# Patient Record
Sex: FEMALE | Race: WHITE | NOT HISPANIC OR LATINO | Employment: UNEMPLOYED | URBAN - METROPOLITAN AREA
[De-identification: names, ages, dates, MRNs, and addresses within clinical notes are randomized per-mention and may not be internally consistent; named-entity substitution may affect disease eponyms.]

---

## 2017-01-10 ENCOUNTER — HOSPITAL ENCOUNTER (INPATIENT)
Facility: HOSPITAL | Age: 82
LOS: 7 days | Discharge: HOME HEALTH | End: 2017-01-17
Attending: INTERNAL MEDICINE | Admitting: INTERNAL MEDICINE

## 2017-01-10 DIAGNOSIS — R26.2 DIFFICULTY WALKING: Primary | ICD-10-CM

## 2017-01-10 PROBLEM — L03.119 CELLULITIS OF LOWER EXTREMITY: Status: ACTIVE | Noted: 2017-01-10

## 2017-01-10 PROBLEM — I87.2 VENOUS STASIS DERMATITIS OF BOTH LOWER EXTREMITIES: Status: ACTIVE | Noted: 2017-01-10

## 2017-01-10 PROBLEM — Z66 DNR (DO NOT RESUSCITATE): Status: ACTIVE | Noted: 2017-01-10

## 2017-01-10 PROBLEM — S81.809A OPEN LEG WOUND: Status: ACTIVE | Noted: 2017-01-10

## 2017-01-10 PROBLEM — L03.90 CELLULITIS: Status: ACTIVE | Noted: 2017-01-10

## 2017-01-10 PROBLEM — I87.2 VENOUS INSUFFICIENCY: Status: ACTIVE | Noted: 2017-01-10

## 2017-01-10 PROBLEM — R01.1 HEART MURMUR: Status: ACTIVE | Noted: 2017-01-10

## 2017-01-10 LAB
ANION GAP SERPL CALCULATED.3IONS-SCNC: 11.7 MMOL/L
BASOPHILS # BLD AUTO: 0.03 10*3/MM3 (ref 0–0.2)
BASOPHILS NFR BLD AUTO: 0.5 % (ref 0–1.5)
BUN BLD-MCNC: 16 MG/DL (ref 8–23)
BUN/CREAT SERPL: 13.8 (ref 7–25)
CALCIUM SPEC-SCNC: 9.1 MG/DL (ref 8.2–9.6)
CHLORIDE SERPL-SCNC: 102 MMOL/L (ref 98–107)
CO2 SERPL-SCNC: 26.3 MMOL/L (ref 22–29)
CREAT BLD-MCNC: 1.16 MG/DL (ref 0.57–1)
DEPRECATED RDW RBC AUTO: 51.2 FL (ref 37–54)
EOSINOPHIL # BLD AUTO: 0.23 10*3/MM3 (ref 0–0.7)
EOSINOPHIL NFR BLD AUTO: 3.6 % (ref 0.3–6.2)
ERYTHROCYTE [DISTWIDTH] IN BLOOD BY AUTOMATED COUNT: 14.4 % (ref 11.7–13)
GFR SERPL CREATININE-BSD FRML MDRD: 43 ML/MIN/1.73
GLUCOSE BLD-MCNC: 95 MG/DL (ref 65–99)
HCT VFR BLD AUTO: 33.7 % (ref 35.6–45.5)
HGB BLD-MCNC: 10.4 G/DL (ref 11.9–15.5)
IMM GRANULOCYTES # BLD: 0 10*3/MM3 (ref 0–0.03)
IMM GRANULOCYTES NFR BLD: 0 % (ref 0–0.5)
LYMPHOCYTES # BLD AUTO: 1.2 10*3/MM3 (ref 0.9–4.8)
LYMPHOCYTES NFR BLD AUTO: 18.9 % (ref 19.6–45.3)
MAGNESIUM SERPL-MCNC: 2.3 MG/DL (ref 1.7–2.3)
MCH RBC QN AUTO: 30.1 PG (ref 26.9–32)
MCHC RBC AUTO-ENTMCNC: 30.9 G/DL (ref 32.4–36.3)
MCV RBC AUTO: 97.7 FL (ref 80.5–98.2)
MONOCYTES # BLD AUTO: 0.88 10*3/MM3 (ref 0.2–1.2)
MONOCYTES NFR BLD AUTO: 13.9 % (ref 5–12)
NEUTROPHILS # BLD AUTO: 4 10*3/MM3 (ref 1.9–8.1)
NEUTROPHILS NFR BLD AUTO: 63.1 % (ref 42.7–76)
PHOSPHATE SERPL-MCNC: 3.6 MG/DL (ref 2.5–4.5)
PLATELET # BLD AUTO: 318 10*3/MM3 (ref 140–500)
PMV BLD AUTO: 8.7 FL (ref 6–12)
POTASSIUM BLD-SCNC: 4.5 MMOL/L (ref 3.5–5.2)
RBC # BLD AUTO: 3.45 10*6/MM3 (ref 3.9–5.2)
SODIUM BLD-SCNC: 140 MMOL/L (ref 136–145)
WBC NRBC COR # BLD: 6.34 10*3/MM3 (ref 4.5–10.7)

## 2017-01-10 PROCEDURE — 25010000003 CEFAZOLIN PER 500 MG: Performed by: INTERNAL MEDICINE

## 2017-01-10 PROCEDURE — 80048 BASIC METABOLIC PNL TOTAL CA: CPT | Performed by: INTERNAL MEDICINE

## 2017-01-10 PROCEDURE — 83735 ASSAY OF MAGNESIUM: CPT | Performed by: INTERNAL MEDICINE

## 2017-01-10 PROCEDURE — 85025 COMPLETE CBC W/AUTO DIFF WBC: CPT | Performed by: INTERNAL MEDICINE

## 2017-01-10 PROCEDURE — 84100 ASSAY OF PHOSPHORUS: CPT | Performed by: INTERNAL MEDICINE

## 2017-01-10 RX ORDER — RANITIDINE 150 MG/1
150 TABLET ORAL DAILY
COMMUNITY

## 2017-01-10 RX ORDER — CYCLOSPORINE 0.5 MG/ML
1 EMULSION OPHTHALMIC EVERY 12 HOURS
COMMUNITY

## 2017-01-10 RX ORDER — PRAMIPEXOLE DIHYDROCHLORIDE 0.25 MG/1
0.25 TABLET ORAL NIGHTLY
Status: DISCONTINUED | OUTPATIENT
Start: 2017-01-10 | End: 2017-01-17 | Stop reason: HOSPADM

## 2017-01-10 RX ORDER — LOSARTAN POTASSIUM 50 MG/1
100 TABLET ORAL DAILY
COMMUNITY

## 2017-01-10 RX ORDER — LOSARTAN POTASSIUM 50 MG/1
50 TABLET ORAL DAILY
Status: DISCONTINUED | OUTPATIENT
Start: 2017-01-10 | End: 2017-01-17 | Stop reason: HOSPADM

## 2017-01-10 RX ORDER — HYDROCODONE BITARTRATE AND ACETAMINOPHEN 5; 325 MG/1; MG/1
0.5 TABLET ORAL EVERY 6 HOURS
Status: ON HOLD | COMMUNITY
End: 2017-01-17

## 2017-01-10 RX ORDER — BACLOFEN 10 MG/1
5 TABLET ORAL NIGHTLY
COMMUNITY

## 2017-01-10 RX ORDER — ACETAMINOPHEN 500 MG
500 TABLET ORAL EVERY 4 HOURS PRN
COMMUNITY

## 2017-01-10 RX ORDER — HYDROCODONE BITARTRATE AND ACETAMINOPHEN 5; 325 MG/1; MG/1
0.5 TABLET ORAL EVERY 6 HOURS
Status: DISCONTINUED | OUTPATIENT
Start: 2017-01-10 | End: 2017-01-17 | Stop reason: HOSPADM

## 2017-01-10 RX ORDER — FAMOTIDINE 20 MG/1
20 TABLET, FILM COATED ORAL DAILY
Status: DISCONTINUED | OUTPATIENT
Start: 2017-01-10 | End: 2017-01-17 | Stop reason: HOSPADM

## 2017-01-10 RX ORDER — SENNOSIDES 8.6 MG
2 TABLET ORAL NIGHTLY
Status: DISCONTINUED | OUTPATIENT
Start: 2017-01-10 | End: 2017-01-17 | Stop reason: HOSPADM

## 2017-01-10 RX ORDER — MAGNESIUM CARB/ALUMINUM HYDROX 105-160MG
150 TABLET,CHEWABLE ORAL AS NEEDED
COMMUNITY

## 2017-01-10 RX ORDER — ACETAMINOPHEN 325 MG/1
650 TABLET ORAL EVERY 4 HOURS PRN
Status: DISCONTINUED | OUTPATIENT
Start: 2017-01-10 | End: 2017-01-17 | Stop reason: HOSPADM

## 2017-01-10 RX ORDER — PROMETHAZINE HYDROCHLORIDE 25 MG/1
25 TABLET ORAL EVERY 6 HOURS PRN
Status: DISCONTINUED | OUTPATIENT
Start: 2017-01-10 | End: 2017-01-17 | Stop reason: HOSPADM

## 2017-01-10 RX ORDER — TRAMADOL HYDROCHLORIDE 50 MG/1
50 TABLET ORAL NIGHTLY
Status: ON HOLD | COMMUNITY
End: 2017-01-17

## 2017-01-10 RX ORDER — BACLOFEN 10 MG/1
5 TABLET ORAL NIGHTLY
Status: DISCONTINUED | OUTPATIENT
Start: 2017-01-10 | End: 2017-01-17 | Stop reason: HOSPADM

## 2017-01-10 RX ORDER — LATANOPROST 50 UG/ML
1 SOLUTION/ DROPS OPHTHALMIC NIGHTLY
Status: DISCONTINUED | OUTPATIENT
Start: 2017-01-10 | End: 2017-01-17 | Stop reason: HOSPADM

## 2017-01-10 RX ORDER — TRAMADOL HYDROCHLORIDE 50 MG/1
50 TABLET ORAL NIGHTLY
Status: DISCONTINUED | OUTPATIENT
Start: 2017-01-10 | End: 2017-01-17 | Stop reason: HOSPADM

## 2017-01-10 RX ORDER — SENNOSIDES 8.6 MG
2 TABLET ORAL NIGHTLY
COMMUNITY

## 2017-01-10 RX ORDER — ASPIRIN 81 MG/1
81 TABLET, CHEWABLE ORAL DAILY
COMMUNITY

## 2017-01-10 RX ORDER — HYDROCODONE BITARTRATE AND ACETAMINOPHEN 5; 325 MG/1; MG/1
0.5 TABLET ORAL EVERY 4 HOURS PRN
Status: DISCONTINUED | OUTPATIENT
Start: 2017-01-10 | End: 2017-01-17 | Stop reason: HOSPADM

## 2017-01-10 RX ORDER — CILOSTAZOL 100 MG/1
100 TABLET ORAL 2 TIMES DAILY
Status: DISCONTINUED | OUTPATIENT
Start: 2017-01-10 | End: 2017-01-17 | Stop reason: HOSPADM

## 2017-01-10 RX ORDER — ONDANSETRON 4 MG/1
4 TABLET, FILM COATED ORAL EVERY 6 HOURS PRN
Status: DISCONTINUED | OUTPATIENT
Start: 2017-01-10 | End: 2017-01-17 | Stop reason: HOSPADM

## 2017-01-10 RX ORDER — SODIUM CHLORIDE 0.9 % (FLUSH) 0.9 %
1-10 SYRINGE (ML) INJECTION AS NEEDED
Status: DISCONTINUED | OUTPATIENT
Start: 2017-01-10 | End: 2017-01-17 | Stop reason: HOSPADM

## 2017-01-10 RX ORDER — DOCUSATE SODIUM 250 MG
250 CAPSULE ORAL NIGHTLY
COMMUNITY

## 2017-01-10 RX ORDER — ONDANSETRON 2 MG/ML
4 INJECTION INTRAMUSCULAR; INTRAVENOUS EVERY 6 HOURS PRN
Status: DISCONTINUED | OUTPATIENT
Start: 2017-01-10 | End: 2017-01-17 | Stop reason: HOSPADM

## 2017-01-10 RX ORDER — POLYETHYLENE GLYCOL 3350 17 G/17G
17 POWDER, FOR SOLUTION ORAL 2 TIMES DAILY
Status: DISCONTINUED | OUTPATIENT
Start: 2017-01-10 | End: 2017-01-17 | Stop reason: HOSPADM

## 2017-01-10 RX ORDER — POLYETHYLENE GLYCOL 3350 17 G/17G
17 POWDER, FOR SOLUTION ORAL 2 TIMES DAILY
COMMUNITY

## 2017-01-10 RX ORDER — CIPROFLOXACIN 250 MG/1
250 TABLET, FILM COATED ORAL 2 TIMES DAILY
COMMUNITY
End: 2017-01-17 | Stop reason: HOSPADM

## 2017-01-10 RX ORDER — GABAPENTIN 300 MG/1
300 CAPSULE ORAL 2 TIMES DAILY
Status: DISCONTINUED | OUTPATIENT
Start: 2017-01-10 | End: 2017-01-17 | Stop reason: HOSPADM

## 2017-01-10 RX ORDER — HYDROCODONE BITARTRATE AND ACETAMINOPHEN 5; 325 MG/1; MG/1
0.5 TABLET ORAL EVERY 4 HOURS PRN
COMMUNITY
End: 2017-01-17 | Stop reason: HOSPADM

## 2017-01-10 RX ORDER — CHOLECALCIFEROL (VITAMIN D3) 10 MCG
1 TABLET ORAL DAILY
COMMUNITY

## 2017-01-10 RX ORDER — LATANOPROST 50 UG/ML
1 SOLUTION/ DROPS OPHTHALMIC NIGHTLY
COMMUNITY

## 2017-01-10 RX ORDER — LACTULOSE 10 G/15ML
30 SOLUTION ORAL 2 TIMES DAILY PRN
COMMUNITY

## 2017-01-10 RX ORDER — LACTULOSE 10 G/15ML
30 SOLUTION ORAL 2 TIMES DAILY PRN
Status: DISCONTINUED | OUTPATIENT
Start: 2017-01-10 | End: 2017-01-17 | Stop reason: HOSPADM

## 2017-01-10 RX ORDER — AMOXICILLIN 500 MG/1
500 CAPSULE ORAL AS NEEDED
COMMUNITY
End: 2017-01-17 | Stop reason: HOSPADM

## 2017-01-10 RX ORDER — PRAMIPEXOLE DIHYDROCHLORIDE 0.25 MG/1
0.25 TABLET ORAL NIGHTLY
COMMUNITY

## 2017-01-10 RX ORDER — ACETAMINOPHEN 500 MG
500 TABLET ORAL EVERY 4 HOURS PRN
Status: DISCONTINUED | OUTPATIENT
Start: 2017-01-10 | End: 2017-01-17 | Stop reason: HOSPADM

## 2017-01-10 RX ORDER — ONDANSETRON 4 MG/1
4 TABLET, ORALLY DISINTEGRATING ORAL EVERY 6 HOURS PRN
Status: DISCONTINUED | OUTPATIENT
Start: 2017-01-10 | End: 2017-01-17 | Stop reason: HOSPADM

## 2017-01-10 RX ORDER — ASPIRIN 81 MG/1
81 TABLET, CHEWABLE ORAL DAILY
Status: DISCONTINUED | OUTPATIENT
Start: 2017-01-10 | End: 2017-01-17 | Stop reason: HOSPADM

## 2017-01-10 RX ORDER — CILOSTAZOL 100 MG/1
100 TABLET ORAL 2 TIMES DAILY
COMMUNITY

## 2017-01-10 RX ORDER — PROMETHAZINE HYDROCHLORIDE 25 MG/1
25 TABLET ORAL EVERY 6 HOURS PRN
COMMUNITY

## 2017-01-10 RX ORDER — GABAPENTIN 300 MG/1
300 CAPSULE ORAL 2 TIMES DAILY
COMMUNITY

## 2017-01-10 RX ORDER — CYCLOSPORINE 0.5 MG/ML
1 EMULSION OPHTHALMIC EVERY 12 HOURS
Status: DISCONTINUED | OUTPATIENT
Start: 2017-01-10 | End: 2017-01-17 | Stop reason: HOSPADM

## 2017-01-10 RX ORDER — DOCUSATE SODIUM 250 MG
250 CAPSULE ORAL NIGHTLY
Status: DISCONTINUED | OUTPATIENT
Start: 2017-01-10 | End: 2017-01-17 | Stop reason: HOSPADM

## 2017-01-10 RX ADMIN — ASPIRIN 81 MG: 81 TABLET, CHEWABLE ORAL at 16:02

## 2017-01-10 RX ADMIN — GABAPENTIN 300 MG: 300 CAPSULE ORAL at 16:02

## 2017-01-10 RX ADMIN — LOSARTAN POTASSIUM 50 MG: 50 TABLET, FILM COATED ORAL at 16:02

## 2017-01-10 RX ADMIN — CEFAZOLIN SODIUM 1 G: 1 INJECTION, SOLUTION INTRAVENOUS at 17:53

## 2017-01-10 RX ADMIN — HYDROCODONE BITARTRATE AND ACETAMINOPHEN 0.5 TABLET: 5; 325 TABLET ORAL at 21:17

## 2017-01-10 RX ADMIN — FAMOTIDINE 20 MG: 20 TABLET, FILM COATED ORAL at 16:02

## 2017-01-10 RX ADMIN — BACLOFEN 5 MG: 10 TABLET ORAL at 21:18

## 2017-01-10 RX ADMIN — LATANOPROST 1 DROP: 50 SOLUTION OPHTHALMIC at 21:19

## 2017-01-10 RX ADMIN — SENNOSIDES 17.2 MG: 8.6 TABLET, FILM COATED ORAL at 21:17

## 2017-01-10 RX ADMIN — PRAMIPEXOLE DIHYDROCHLORIDE 0.25 MG: 0.25 TABLET ORAL at 21:18

## 2017-01-10 RX ADMIN — TRAMADOL HYDROCHLORIDE 50 MG: 50 TABLET, COATED ORAL at 21:17

## 2017-01-10 RX ADMIN — DOCUSATE SODIUM 250 MG: 250 CAPSULE, LIQUID FILLED ORAL at 21:18

## 2017-01-10 RX ADMIN — POLYETHYLENE GLYCOL 3350 17 G: 17 POWDER, FOR SOLUTION ORAL at 16:03

## 2017-01-10 RX ADMIN — HYDROCODONE BITARTRATE AND ACETAMINOPHEN 0.5 TABLET: 5; 325 TABLET ORAL at 16:02

## 2017-01-10 RX ADMIN — CILOSTAZOL 100 MG: 100 TABLET ORAL at 16:03

## 2017-01-10 RX ADMIN — CYCLOSPORINE 1 DROP: 0.5 EMULSION OPHTHALMIC at 16:03

## 2017-01-10 NOTE — PLAN OF CARE
Problem: Patient Care Overview (Adult)  Goal: Plan of Care Review  Outcome: Ongoing (interventions implemented as appropriate)    01/10/17 1840   Coping/Psychosocial Response Interventions   Plan Of Care Reviewed With patient   Patient Care Overview   Progress no change   Outcome Evaluation   Outcome Summary/Follow up Plan pt is a direct admit for cellulitis of both legs. pictures taken on admission. pt is complaining of pain in legs rating at a 5. scheduled pain medication given. pt had legs elevated and wrapped with ace wrap and bandages. pt is currently resting in bed.        Goal: Adult Individualization and Mutuality  Outcome: Ongoing (interventions implemented as appropriate)  Goal: Discharge Needs Assessment  Outcome: Ongoing (interventions implemented as appropriate)    Problem: Fall Risk (Adult)  Goal: Identify Related Risk Factors and Signs and Symptoms  Outcome: Outcome(s) achieved Date Met:  01/10/17  Goal: Absence of Falls  Outcome: Ongoing (interventions implemented as appropriate)    Problem: Skin Integrity Impairment, Risk/Actual (Adult)  Goal: Identify Related Risk Factors and Signs and Symptoms  Outcome: Outcome(s) achieved Date Met:  01/10/17  Goal: Skin Integrity/Wound Healing  Outcome: Ongoing (interventions implemented as appropriate)    Problem: Pain, Acute (Adult)  Goal: Identify Related Risk Factors and Signs and Symptoms  Outcome: Outcome(s) achieved Date Met:  01/10/17  Goal: Acceptable Pain Control/Comfort Level  Outcome: Ongoing (interventions implemented as appropriate)

## 2017-01-10 NOTE — CONSULTS
Patient Name: Juliana Gardner Account #: [id number]    MRN: [id number] Admission Date: 1/10/2017      Consulting Service: Vascular Surgery Date of Evaluation: January 10, 2017    Requesting Provider: Terence    CHIEF COMPLAINT: Lower extremity edema with stasis ulcerations and cellulitis   HPI: Juliana Gardner is a 89 y.o. female is being seen for a consultation and evaluation/management of bilateral lower extremity edema with stasis ulcerations and cellulitis.  Patient is well known to me and was in the office yesterday for the same complaint.  She did inform me that she does not is getting better but actually after talking with her primary care doctor Dr. Leah Noel piercings and actually worsened significantly over the last several weeks and she is now being admitted for elevation control the edema with wraps and wound care of the ulcers with appropriate antibiotic therapy.  Complicating her issues is that she is not been able to tolerate includes.  I appreciate Dr. 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       PAST MEDICAL HISTORY:   Past Medical History   Diagnosis Date   • Cellulitis of lower extremity 1/10/2017   • Gastritis    • GERD (gastroesophageal reflux disease)    • Hearing loss    • Heart murmur    • Hypertension    • Macular degeneration    • OA (osteoarthritis)    • Venous stasis dermatitis of both lower extremities 1/10/2017   • Vitamin D deficiency       PAST SURGICAL HISTORY:   Past Surgical History   Procedure Laterality Date   • Replacement total knee        FAMILY HISTORY:   Family History   Problem Relation Age of Onset   • Lung cancer Other       SOCIAL HISTORY:   Social History   Substance Use Topics   • Smoking status: Never Smoker   • Smokeless tobacco: Not on file   • Alcohol use No      MEDICATIONS:   No current facility-administered medications on file prior to encounter.      No current outpatient prescriptions on file prior to encounter.             ALLERGIES:  Adhesive tape; Clindamycin/lincomycin; Rifampin; and Sulfa antibiotics   COMPLETE REVIEW OF SYSTEMS:     ENT ROS: negative  Cardiovascular ROS: no chest pain or dyspnea on exertion  Respiratory ROS: no cough, shortness of breath, or wheezing  Gastrointestinal ROS: no abdominal pain, change in bowel habits, or black or bloody stools  Neurological ROS: no TIA or stroke symptoms  Genito-Urinary ROS: no dysuria, trouble voiding, or hematuria  Musculoskeletal ROS:swelling and ulcers  Dermatological ROS: negative  Psychological ROS: negative        PHYSICAL EXAM:   Patient Vitals for the past 24 hrs:   BP Temp Temp src Pulse Resp SpO2 Height Weight   01/10/17 1220 137/61 97.5 °F (36.4 °C) Oral 76 14 95 % - 129 lb 14.4 oz (58.9 kg)        General appearance: alert, well appearing, and in no distress.  Neurological exam reveals alert, oriented, normal speech, no focal findings or movement disorder noted.  ENT exam reveals - ENT exam normal, no neck nodes or sinus tenderness.  CVS exam: normal rate, regular rhythm, normal S1, S2, no murmurs, rubs, clicks or gallops.  Chest: clear to auscultation, no wheezes, rales or rhonchi, symmetric air entry.  Abdominal exam: soft, nontender, nondistended, no masses or organomegaly.  Examination of the feet reveals warm, good capillary refill and severe edema with bilateral lower extremities.  Left is worse than right with large stasis ulcerations with  superimposed infection on the ulcers and within the skin surrounding it..  Pulse exam:     Right    Left  Carotid   2 2  Brachial  2 2   Radial   2 2  Femoral  2 2  Aorta    1  Popliteal  1 1  Dorsalis Pedis  1 1  Posterior Tibial   1 1          LABS:      Recent Results (from the past 24 hour(s))   Basic Metabolic Panel    Collection Time: 01/10/17 12:32 PM   Result Value Ref Range    Glucose 95 65 - 99 mg/dL    BUN 16 8 - 23 mg/dL    Creatinine 1.16 (H) 0.57 - 1.00 mg/dL    Sodium 140 136 - 145 mmol/L    Potassium 4.5 3.5 - 5.2 mmol/L     Chloride 102 98 - 107 mmol/L    CO2 26.3 22.0 - 29.0 mmol/L    Calcium 9.1 8.2 - 9.6 mg/dL    eGFR Non African Amer 43 (L) >60 mL/min/1.73    BUN/Creatinine Ratio 13.8 7.0 - 25.0    Anion Gap 11.7 mmol/L   Magnesium    Collection Time: 01/10/17 12:32 PM   Result Value Ref Range    Magnesium 2.3 1.7 - 2.3 mg/dL   Phosphorus    Collection Time: 01/10/17 12:32 PM   Result Value Ref Range    Phosphorus 3.6 2.5 - 4.5 mg/dL   CBC Auto Differential    Collection Time: 01/10/17 12:32 PM   Result Value Ref Range    WBC 6.34 4.50 - 10.70 10*3/mm3    RBC 3.45 (L) 3.90 - 5.20 10*6/mm3    Hemoglobin 10.4 (L) 11.9 - 15.5 g/dL    Hematocrit 33.7 (L) 35.6 - 45.5 %    MCV 97.7 80.5 - 98.2 fL    MCH 30.1 26.9 - 32.0 pg    MCHC 30.9 (L) 32.4 - 36.3 g/dL    RDW 14.4 (H) 11.7 - 13.0 %    RDW-SD 51.2 37.0 - 54.0 fl    MPV 8.7 6.0 - 12.0 fL    Platelets 318 140 - 500 10*3/mm3    Neutrophil % 63.1 42.7 - 76.0 %    Lymphocyte % 18.9 (L) 19.6 - 45.3 %    Monocyte % 13.9 (H) 5.0 - 12.0 %    Eosinophil % 3.6 0.3 - 6.2 %    Basophil % 0.5 0.0 - 1.5 %    Immature Grans % 0.0 0.0 - 0.5 %    Neutrophils, Absolute 4.00 1.90 - 8.10 10*3/mm3    Lymphocytes, Absolute 1.20 0.90 - 4.80 10*3/mm3    Monocytes, Absolute 0.88 0.20 - 1.20 10*3/mm3    Eosinophils, Absolute 0.23 0.00 - 0.70 10*3/mm3    Basophils, Absolute 0.03 0.00 - 0.20 10*3/mm3    Immature Grans, Absolute 0.00 0.00 - 0.03 10*3/mm3   ]    The following radiologic or non-invasive studies have been reviewed by me:        ASSESSMENT/PLAN: 89 y.o. female with stasis ulcerations and chronic edema resistant to outpatient care.  She is in need of antibiotic therapy elevation compression and wound care planning.  We will follow along with you and try to write orders for the wound care.  We'll asked wound care nursing to evaluate as well.  We'll elevate and use wraps while she is in.      I discussed the plan with the patient and she is agreeable to the plan of care at this point. Thank you for  this consult.     Bo Albrecht MD   01/10/17

## 2017-01-10 NOTE — NURSING NOTE
01/10/17 1518   Wound 01/10/17 Left lower     Date first assessed: 01/10/17   Side: Left  Orientation: lower  Location: (c)   Type: (c)    Wound WDL ex   Dressing Appearance moist drainage   Base reddened;yellow;not granulating   Periwound Area redness;swelling;warm   Edges irregular   Length (cm) 5   Width (cm) 2   Depth (cm) 0.1   Drainage Characteristics/Odor yellow   Drainage Amount small   Wound Cleaning cleansed with;sterile normal saline   Dressing Dressing removed;Dressing applied;Dressing changed;silver impregnated dressing;non-adherent   Wound 01/10/17 Left lower     Date first assessed: 01/10/17   Side: Left  Orientation: lower  Location: (c)   Type: (c)    Wound WDL ex   Dressing Appearance moist drainage   Base reddened;slough;yellow;not granulating;moist   Periwound Area redness;swelling   Edges irregular   Length (cm) 3   Width (cm) 5   Depth (cm) 0.1   Drainage Characteristics/Odor yellow   Drainage Amount small   Wound Cleaning cleansed with;sterile normal saline   Dressing Dressing removed;Dressing applied;Dressing changed;silver impregnated dressing;non-adherent   Wound 01/10/17 Right lower     Date first assessed: 01/10/17   Side: Right  Orientation: lower  Location: (c)   Type: (c)    Wound WDL ex   Dressing Appearance dried drainage   Base reddened;yellow   Edges irregular   Length (cm) 3   Width (cm) 1   Drainage Characteristics/Odor yellow   Drainage Amount small   Hydrofiber with silver dressing applied to leg ulcers, covered with silicone border dressing.  Kerlix roll ace wraps bilateral. 3-4 plus edema. Pulse palpable. Legs elevated on pillows

## 2017-01-10 NOTE — H&P
Patient Care Team:  Wanda Noel MD as PCP - General (Internal Medicine)    Chief complaint : leg wounds and redness    Subjective     History of Present Illness    Sister Juliana Gardner is a very pleasant 89 year old who has had issues with recurrent wound and infection of her legs. She states issue has been waxing and waning for the past 6 months or so. Has been on a few different wound care regimen as well as different PO abx- most recently a course of CIPRO which finished 1/1/17. Still not improved. No fevers/chills. Seen by Dr. Albrecht recently who felt as she is having persistent infection as outpatient that would need to be admitted for IV abx and continued wound care.     Review of Systems   Constitutional: Negative.    HENT: Negative.    Eyes: Negative.    Respiratory: Negative.    Cardiovascular: Positive for leg swelling. Negative for chest pain and palpitations.   Gastrointestinal: Negative.    Endocrine: Negative.    Genitourinary: Negative.    Musculoskeletal: Negative.    Skin: Positive for color change, rash and wound.   Allergic/Immunologic: Negative.    Neurological: Negative.    Hematological: Negative.    Psychiatric/Behavioral: Negative.     Ext- chronic leg welling, no history of blood clots  Ear- chronic hard of hearing    Past Medical History   Diagnosis Date   • Cellulitis of lower extremity 1/10/2017   • Gastritis    • GERD (gastroesophageal reflux disease)    • Hearing loss    • Heart murmur    • Hypertension    • Macular degeneration    • OA (osteoarthritis)    • Venous stasis dermatitis of both lower extremities 1/10/2017   • Vitamin D deficiency      Past Surgical History   Procedure Laterality Date   • Replacement total knee       Family History   Problem Relation Age of Onset   • Lung cancer Other      Social History   Substance Use Topics   • Smoking status: Never Smoker   • Smokeless tobacco: Not on file   • Alcohol use No     Prescriptions Prior to Admission   Medication Sig  Dispense Refill Last Dose   • acetaminophen (TYLENOL) 500 MG tablet Take 500 mg by mouth Every 4 (Four) Hours As Needed for mild pain (1-3) or fever.      • amoxicillin (AMOXIL) 500 MG capsule Take 500 mg by mouth As Needed. Give 4 capsules as needed one hour before dental appointments      • aspirin 81 MG chewable tablet Chew 81 mg Daily.      • b complex-C-folic acid 1 MG capsule Take 1 capsule by mouth Daily.      • baclofen (LIORESAL) 10 MG tablet Take 5 mg by mouth Every Night.      • cilostazol (PLETAL) 100 MG tablet Take 100 mg by mouth 2 (Two) Times a Day.      • ciprofloxacin (CIPRO) 250 MG tablet Take 250 mg by mouth 2 (Two) Times a Day.      • cycloSPORINE (RESTASIS) 0.05 % ophthalmic emulsion Administer 1 drop to both eyes Every 12 (Twelve) Hours.      • docusate sodium (COLACE) 250 MG capsule Take 250 mg by mouth Every Night.      • gabapentin (NEURONTIN) 300 MG capsule Take 300 mg by mouth 2 (Two) Times a Day.      • HYDROcodone-acetaminophen (NORCO) 5-325 MG per tablet Take 0.5 tablets by mouth Every 6 (Six) Hours.      • HYDROcodone-acetaminophen (NORCO) 5-325 MG per tablet Take 0.5 tablets by mouth Every 4 (Four) Hours As Needed for mild pain (1-3). May be given in between scheduled dose      • lactulose (CHRONULAC) 10 GM/15ML solution Take 30 mL by mouth 2 (Two) Times a Day As Needed (bowel management).      • latanoprost (XALATAN) 0.005 % ophthalmic solution Administer 1 drop to both eyes Every Night.      • losartan (COZAAR) 50 MG tablet Take 100 mg by mouth Daily. Hold for systolic blood pressure <110      • magnesium citrate 1.745 GM/30ML solution solution Take 150 mL by mouth As Needed (bowel management).      • mineral oil 55 % oil oral liquid Take 15 mL by mouth Daily.      • polyethylene glycol (MIRALAX) packet Take 17 g by mouth 2 (Two) Times a Day.      • pramipexole (MIRAPEX) 0.25 MG tablet Take 0.25 mg by mouth Every Night.      • promethazine (PHENERGAN) 25 MG tablet Take 25 mg by  mouth Every 6 (Six) Hours As Needed for nausea or vomiting.      • raNITIdine (ZANTAC) 150 MG tablet Take 150 mg by mouth Daily.      • senna (SENOKOT) 8.6 MG tablet tablet Take 2 tablets by mouth Every Night.      • traMADol (ULTRAM) 50 MG tablet Take 50 mg by mouth Every Night.        Allergies:  Adhesive tape; Clindamycin/lincomycin; Rifampin; and Sulfa antibiotics    Objective      Vital Signs       Physical Exam   Constitutional: She is oriented to person, place, and time. No distress.   Elderly but NAD   HENT:   Head: Normocephalic and atraumatic.   Mouth/Throat: Oropharynx is clear and moist.   Eyes: EOM are normal. Pupils are equal, round, and reactive to light. No scleral icterus.   Neck: Normal range of motion. Neck supple.   Cardiovascular: Normal rate and regular rhythm.    Murmur heard.  Pulmonary/Chest: Effort normal and breath sounds normal.   Abdominal: Soft. Bowel sounds are normal. She exhibits no distension. There is no tenderness.   Genitourinary:   Genitourinary Comments: Deferred    Musculoskeletal: She exhibits edema.   Neurological: She is alert and oriented to person, place, and time. No cranial nerve deficit.   Hard of hearing   Skin: Skin is warm. Rash (to lower extremities bilaterally. Erythema c/w cellulitis and venous stasis as well as ulcerations) noted.   Psychiatric: She has a normal mood and affect. Her behavior is normal. Thought content normal.   Nursing note and vitals reviewed.      Results Review:   I reviewed the patient's new clinical results.      CBC Auto Differential [[id number]]        Lab Status: No result Specimen: Blood        Basic Metabolic Panel [[id number]]        Lab Status: No result Specimen: Blood        Magnesium [[id number]]        Lab Status: No result Specimen: Blood        Phosphorus [[id number]]        Lab Status: No result Specimen: Blood       Reviewed records from her SN[facility]      Assessment/Plan     Active Problems:    Cellulitis of lower extremity    Venous  stasis dermatitis of both lower extremities    Open leg wound    DNR (do not resuscitate)    Heart murmur    Cellulitis    Venous insufficiency of both lower extremities    -IV abx with Cefazolin for likely strep. Decreased risk of adverse reaction including renal failure compared to vanc but will see how she responds.  -Check labs  -DNR/DNI- confirmed with pt  -Dr Albrecht consult for wound care. If some topical steroids could be involved could improve the element of stasis dermaitits, though likely would not want to apply that in area of the ulcerations/wound.   -PRN agents for pain, nausea  -Will not workup heart murmur here. Listed as having prn abx before dental procedure so im sure this is known/old.     Assessment & Plan    I discussed the patients findings and my recommendations with patient, family and consulting provider- Dr. Ambrocio Pratt MD  01/10/17  12:18 PM

## 2017-01-10 NOTE — IP AVS SNAPSHOT
AFTER VISIT SUMMARY             Juliana Jj           About your hospitalization     You were admitted on:  January 10, 2017 You last received care in the:  [address]       Procedures & Surgeries         Medications    If you or your caregiver advised us that you are currently taking a medication and that medication is marked below as “Resume”, this simply indicates that we have reviewed those medications to make sure our new therapy recommendations do not interfere.  If you have concerns about medications other than those new ones which we are prescribing today, please consult the physician who prescribed them (or your primary physician).  Our review of your home medications is not meant to indicate that we are directing their use.             Your Medications      START taking these medications     cephalexin 500 MG capsule   Take 1 capsule by mouth 2 (Two) Times a Day.   Commonly known as:  KEAGAN             CHANGE how you take these medications     HYDROcodone-acetaminophen 5-325 MG per tablet   Take 0.5 tablets by mouth Every 4 (Four) Hours As Needed for moderate pain (4-6).   Last time this was given:  1/17/2017 12:49 PM   Commonly known as:  JACQUE   What changed:    - when to take this  - reasons to take this  - Another medication with the same name was removed. Continue taking this medication, and follow the directions you see here.             CONTINUE taking these medications     acetaminophen 500 MG tablet   Take 500 mg by mouth Every 4 (Four) Hours As Needed for mild pain (1-3) or fever.   Commonly known as:  TYLENOL           aspirin 81 MG chewable tablet   Chew 81 mg Daily.   Last time this was given:  1/17/2017  9:31 AM           b complex-C-folic acid 1 MG capsule   Take 1 capsule by mouth Daily.           baclofen 10 MG tablet   Take 5 mg by mouth Every Night.   Last time this was given:  1/16/2017  8:36 PM   Commonly known as:  LIORESAL           cilostazol 100 MG tablet    Take 100 mg by mouth 2 (Two) Times a Day.   Last time this was given:  1/17/2017  9:31 AM   Commonly known as:  PLETAL           cycloSPORINE 0.05 % ophthalmic emulsion   Administer 1 drop to both eyes Every 12 (Twelve) Hours.   Last time this was given:  1/17/2017  9:34 AM   Commonly known as:  RESTASIS           docusate sodium 250 MG capsule   Take 250 mg by mouth Every Night.   Last time this was given:  1/16/2017  8:36 PM   Commonly known as:  COLACE           gabapentin 300 MG capsule   Take 300 mg by mouth 2 (Two) Times a Day.   Last time this was given:  1/17/2017  9:31 AM   Commonly known as:  NEURONTIN           lactulose 10 GM/15ML solution   Take 30 mL by mouth 2 (Two) Times a Day As Needed (bowel management).   Commonly known as:  CHRONULAC           latanoprost 0.005 % ophthalmic solution   Administer 1 drop to both eyes Every Night.   Last time this was given:  1/16/2017  8:36 PM   Commonly known as:  XALATAN           losartan 50 MG tablet   Take 100 mg by mouth Daily. Hold for systolic blood pressure <110   Last time this was given:  1/17/2017  9:31 AM   Commonly known as:  COZAAR           magnesium citrate 1.745 GM/30ML solution solution   Take 150 mL by mouth As Needed (bowel management).           mineral oil 55 % oil oral liquid   Take 15 mL by mouth Daily.           polyethylene glycol packet   Take 17 g by mouth 2 (Two) Times a Day.   Last time this was given:  1/17/2017  9:30 AM   Commonly known as:  MIRALAX           pramipexole 0.25 MG tablet   Take 0.25 mg by mouth Every Night.   Last time this was given:  1/16/2017  8:36 PM   Commonly known as:  MIRAPEX           promethazine 25 MG tablet   Take 25 mg by mouth Every 6 (Six) Hours As Needed for nausea or vomiting.   Commonly known as:  PHENERGAN           raNITIdine 150 MG tablet   Take 150 mg by mouth Daily.   Commonly known as:  ZANTAC           senna 8.6 MG tablet tablet   Take 2 tablets by mouth Every Night.   Last time this was  given:  1/16/2017  8:36 PM   Commonly known as:  SENOKOT           traMADol 50 MG tablet   Take 1 tablet by mouth Every Night.   Last time this was given:  1/16/2017  8:36 PM   Commonly known as:  ULTRAM             STOP taking these medications     amoxicillin 500 MG capsule   Commonly known as:  AMOXIL           ciprofloxacin 250 MG tablet   Commonly known as:  CIPRO                Where to Get Your Medications      These medications were sent to [facility] [address] [facility] - [phone] [facility] [phone]   [address] [facility], [place] [id number]     Phone:  [phone]     cephalexin 500 MG capsule         You can get these medications from any pharmacy     Bring a paper prescription for each of these medications     HYDROcodone-acetaminophen 5-325 MG per tablet    traMADol 50 MG tablet                  Your Medications      Your Medication List           Morning Noon Evening Bedtime As Needed    acetaminophen 500 MG tablet   Take 500 mg by mouth Every 4 (Four) Hours As Needed for mild pain (1-3) or fever.   Commonly known as:  TYLENOL                                   aspirin 81 MG chewable tablet   Chew 81 mg Daily.                                   b complex-C-folic acid 1 MG capsule   Take 1 capsule by mouth Daily.                                   baclofen 10 MG tablet   Take 5 mg by mouth Every Night.   Commonly known as:  LIORESAL                                   cephalexin 500 MG capsule   Take 1 capsule by mouth 2 (Two) Times a Day.   Commonly known as:  KEFLEX                                      cilostazol 100 MG tablet   Take 100 mg by mouth 2 (Two) Times a Day.   Commonly known as:  PLETAL                                      cycloSPORINE 0.05 % ophthalmic emulsion   Administer 1 drop to both eyes Every 12 (Twelve) Hours.   Commonly known as:  RESTASIS                                      docusate sodium 250 MG capsule   Take 250 mg by mouth Every Night.    Commonly known as:  COLACE                                   gabapentin 300 MG capsule   Take 300 mg by mouth 2 (Two) Times a Day.   Commonly known as:  NEURONTIN                                      HYDROcodone-acetaminophen 5-325 MG per tablet   Take 0.5 tablets by mouth Every 4 (Four) Hours As Needed for moderate pain (4-6).   Commonly known as:  NORCO                                   lactulose 10 GM/15ML solution   Take 30 mL by mouth 2 (Two) Times a Day As Needed (bowel management).   Commonly known as:  CHRONULAC                                   latanoprost 0.005 % ophthalmic solution   Administer 1 drop to both eyes Every Night.   Commonly known as:  XALATAN                                   losartan 50 MG tablet   Take 100 mg by mouth Daily. Hold for systolic blood pressure <110   Commonly known as:  COZAAR                                   magnesium citrate 1.745 GM/30ML solution solution   Take 150 mL by mouth As Needed (bowel management).                                   mineral oil 55 % oil oral liquid   Take 15 mL by mouth Daily.                                   polyethylene glycol packet   Take 17 g by mouth 2 (Two) Times a Day.   Commonly known as:  MIRALAX                                      pramipexole 0.25 MG tablet   Take 0.25 mg by mouth Every Night.   Commonly known as:  MIRAPEX                                   promethazine 25 MG tablet   Take 25 mg by mouth Every 6 (Six) Hours As Needed for nausea or vomiting.   Commonly known as:  PHENERGAN                                   raNITIdine 150 MG tablet   Take 150 mg by mouth Daily.   Commonly known as:  ZANTAC                                   senna 8.6 MG tablet tablet   Take 2 tablets by mouth Every Night.   Commonly known as:  SENOKOT                                   traMADol 50 MG tablet   Take 1 tablet by mouth Every Night.   Commonly known as:  ULTRAM                                            Instructions for After Discharge         Activity Instructions     Activity as Tolerated                 Diet Instructions     Diet: Regular; Thin Liquids, No Restrictions       Discharge Diet:  Regular   Fluid Consistency:  Thin Liquids, No Restrictions             Other Instructions     Discharge Dressing / Wound Instructions       Instructions: per Dr Albrecht             Discharge References/Attachments     CELLULITIS (ENGLISH)    CEPHALEXIN TABLETS OR CAPSULES (ENGLISH)       Follow-ups for After Discharge        Follow-up Information     Follow up with [facility] .    Specialty:  Assisted Living Facility    Contact information:    [address] [facility] [address] [phone]        Follow up with Wanda Noel MD .    Specialty:  Internal Medicine    Contact information:    [address] [facility] [address] [phone]        Referrals and Follow-ups to Schedule     Follow-Up    As directed    Follow up with Dr Albrecht as directed   Follow Up Details:  as directed             HW Signup     [email or web address] allows you to send messages to your doctor, view your test results, renew your prescriptions, schedule appointments, and more. To sign up, go to [email or web address] and click on the Sign Up Now link in the New User? box. Enter your HW Activation Code exactly as it appears below along with the last four digits of your Social Security Number and your Date of Birth () to complete the sign-up process. If you do not sign up before the expiration date, you must request a new code.    HW Activation Code: [email or web address]  Expires: 2017  2:36 PM    If you have questions, you can email [email or web address] or call [phone] to talk to our HW staff. Remember, HW is NOT to be used for urgent needs. For medical emergencies, dial 911.           Summary of Your Hospitalization        Reason for Hospitalization     Your primary diagnosis was:  Not on File    Your diagnoses  also included:  Bacterial Skin Infection Of Leg, Venous Stasis Dermatitis Of Both Lower Extremities, Open Wound Of Leg, Dnr (Do Not Resuscitate), Heart Murmur, Bacterial Skin Infection, Venous Insufficiency Of Both Lower Extremities      Care Providers     Provider Service Role Specialty    Ash Chase MD Internal Medicine Attending Provider Internal Medicine    Bo Albrecht MD -- Consulting Physician  Vascular Surgery      Your Allergies  Date Reviewed: 1/10/2017    Allergen Reactions    Adhesive Tape Not Noted         Clindamycin/Lincomycin Not Noted         Rifampin Not Noted         Sulfa Antibiotics Not Noted      Patient Belongings Returned     Document Return of Belongings Flowsheet     Were the patient bedside belongings sent home?   --   Belongings Retrieved from Security & Sent Home   --    Belongings Sent to Safe   --   Medications Retrieved from Pharmacy & Sent Home   --              More Information      Cellulitis  Cellulitis is an infection of the skin and the tissue beneath it. The infected area is usually red and tender. Cellulitis occurs most often in the arms and lower legs.   CAUSES   Cellulitis is caused by bacteria that enter the skin through cracks or cuts in the skin. The most common types of bacteria that cause cellulitis are staphylococci and streptococci.  SIGNS AND SYMPTOMS   · Redness and warmth.  · Swelling.  · Tenderness or pain.  · Fever.  DIAGNOSIS   Your health care provider can usually determine what is wrong based on a physical exam. Blood tests may also be done.  TREATMENT   Treatment usually involves taking an antibiotic medicine.  HOME CARE INSTRUCTIONS   · Take your antibiotic medicine as directed by your health care provider. Finish the antibiotic even if you start to feel better.  · Keep the infected arm or leg elevated to reduce swelling.  · Apply a warm cloth to the affected area up to 4 times per day to relieve pain.  · Take medicines only as directed by your  health care provider.  · Keep all follow-up visits as directed by your health care provider.  SEEK MEDICAL CARE IF:   · You notice red streaks coming from the infected area.  · Your red area gets larger or turns dark in color.  · Your bone or joint underneath the infected area becomes painful after the skin has healed.  · Your infection returns in the same area or another area.  · You notice a swollen bump in the infected area.  · You develop new symptoms.  · You have a fever.  SEEK IMMEDIATE MEDICAL CARE IF:   · You feel very sleepy.  · You develop vomiting or diarrhea.  · You have a general ill feeling (malaise) with muscle aches and pains.     This information is not intended to replace advice given to you by your health care provider. Make sure you discuss any questions you have with your health care provider.     Document Released: 09/27/2006 Document Revised: 09/07/2016 Document Reviewed: 03/04/2013  [employer] Interactive Patient Education ©2016 Elsevier Inc.          Cephalexin tablets or capsules  What is this medicine?  CEPHALEXIN (sef a DARRELL in) is a cephalosporin antibiotic. It is used to treat certain kinds of bacterial infections It will not work for colds, flu, or other viral infections.  This medicine may be used for other purposes; ask your health care provider or pharmacist if you have questions.  What should I tell my health care provider before I take this medicine?  They need to know if you have any of these conditions:  -kidney disease  -stomach or intestine problems, especially colitis  -an unusual or allergic reaction to cephalexin, other cephalosporins, penicillins, other antibiotics, medicines, foods, dyes or preservatives  -pregnant or trying to get pregnant  -breast-feeding  How should I use this medicine?  Take this medicine by mouth with a full glass of water. Follow the directions on the prescription label. This medicine can be taken with or without food. Take your medicine at regular  intervals. Do not take your medicine more often than directed. Take all of your medicine as directed even if you think you are better. Do not skip doses or stop your medicine early.  Talk to your pediatrician regarding the use of this medicine in children. While this drug may be prescribed for selected conditions, precautions do apply.  Overdosage: If you think you have taken too much of this medicine contact a poison control center or emergency room at once.  NOTE: This medicine is only for you. Do not share this medicine with others.  What if I miss a dose?  If you miss a dose, take it as soon as you can. If it is almost time for your next dose, take only that dose. Do not take double or extra doses. There should be at least 4 to 6 hours between doses.  What may interact with this medicine?  -probenecid  -some other antibiotics  This list may not describe all possible interactions. Give your health care provider a list of all the medicines, herbs, non-prescription drugs, or dietary supplements you use. Also tell them if you smoke, drink alcohol, or use illegal drugs. Some items may interact with your medicine.  What should I watch for while using this medicine?  Tell your doctor or health care professional if your symptoms do not begin to improve in a few days.  Do not treat diarrhea with over the counter products. Contact your doctor if you have diarrhea that lasts more than 2 days or if it is severe and watery.  If you have diabetes, you may get a false-positive result for sugar in your urine. Check with your doctor or health care professional.  What side effects may I notice from receiving this medicine?  Side effects that you should report to your doctor or health care professional as soon as possible:  -allergic reactions like skin rash, itching or hives, swelling of the face, lips, or tongue  -breathing problems  -pain or trouble passing urine  -redness, blistering, peeling or loosening of the skin,  including inside the mouth  -severe or watery diarrhea  -unusually weak or tired  -yellowing of the eyes, skin  Side effects that usually do not require medical attention (report to your doctor or health care professional if they continue or are bothersome):  -gas or heartburn  -genital or anal irritation  -headache  -joint or muscle pain  -nausea, vomiting  This list may not describe all possible side effects. Call your doctor for medical advice about side effects. You may report side effects to FDA at 6-537-OIV-7884.  Where should I keep my medicine?  Keep out of the reach of children.  Store at room temperature between 59 and 86 degrees F (15 and 30 degrees C). Throw away any unused medicine after the expiration date.  NOTE: This sheet is a summary. It may not cover all possible information. If you have questions about this medicine, talk to your doctor, pharmacist, or health care provider.     © 2016, Elsevier/Gold Standard. (2009-03-23 17:09:13)            SYMPTOMS OF A STROKE    Call 911 or have someone take you to the Emergency Department if you have any of the following:    · Sudden numbness or weakness of your face, arm or leg especially on one side of the body  · Sudden confusion, diffiiculty speaking or trouble understanding   · Changes in your vision or loss of sight in one eye  · Sudden severe headache with no known cause  · sudden dizziness, trouble walking, loss of balance or coordination    It is important to seek emergency care right away if you have further stroke symptoms. If you get emergency help quickly, the powerful clot-dissolving medicines can reduce the disabilities caused by a stroke.     For more information:    American Stroke Association  4-145-1-STROKE  www.strokeassociation.org           IF YOU SMOKE OR USE TOBACCO PLEASE READ THE FOLLOWING:    Why is smoking bad for me?  Smoking increases the risk of heart disease, lung disease, vascular disease, stroke, and cancer.     If you smoke,  STOP!    If you would like more information on quitting smoking, please visit the [employer] website: [email or web address]/Nimiaate/healthier-together/smoke   This link will provide additional resources including the QUIT line and the Beat the Pack support groups.     For more information:    American Cancer Society  ([phone]    American Heart Association  1-908.246.8317               YOU ARE THE MOST IMPORTANT FACTOR IN YOUR RECOVERY.     Follow all instructions carefully.     I have reviewed my discharge instructions with my nurse, including the following information, if applicable:     Information about my illness and diagnosis   Follow up appointments (including lab draws)   Wound Care   Equipment Needs   Medications (new and continuing) along with side effects   Preventative information such as vaccines and smoking cessations   Diet   Pain   I know when to contact my Doctor's office or seek emergency care      I want my nurse to describe the side effects of my medications: YES NO   If the answer is no, I understand the side effects of my medications: YES NO   My nurse described the side effects of my medications in a way that I could understand: YES NO   I have taken my personal belongings and my own medications with me at discharge: YES NO            I have received this information and my questions have been answered. I have discussed any concerns I see with this plan with the nurse or physician. I understand these instructions.    Signature of Patient or Responsible Person: _____________________________________    Date: _________________  Time: __________________    Signature of Healthcare Provider: _______________________________________  Date: _________________  Time: __________________

## 2017-01-10 NOTE — SIGNIFICANT NOTE
01/10/17 1511   Rehab Treatment   Discipline physical therapist   Rehab Evaluation   Evaluation Not Performed patient unavailable for evaluation  (With wound care RN.)   Recommendation   PT - Next Appointment 01/11/17

## 2017-01-11 LAB
ANION GAP SERPL CALCULATED.3IONS-SCNC: 9.2 MMOL/L
BASOPHILS # BLD AUTO: 0.02 10*3/MM3 (ref 0–0.2)
BASOPHILS NFR BLD AUTO: 0.4 % (ref 0–1.5)
BUN BLD-MCNC: 17 MG/DL (ref 8–23)
BUN/CREAT SERPL: 16 (ref 7–25)
CALCIUM SPEC-SCNC: 8.5 MG/DL (ref 8.2–9.6)
CHLORIDE SERPL-SCNC: 105 MMOL/L (ref 98–107)
CO2 SERPL-SCNC: 24.8 MMOL/L (ref 22–29)
CREAT BLD-MCNC: 1.06 MG/DL (ref 0.57–1)
DEPRECATED RDW RBC AUTO: 49.8 FL (ref 37–54)
EOSINOPHIL # BLD AUTO: 0.12 10*3/MM3 (ref 0–0.7)
EOSINOPHIL NFR BLD AUTO: 2.4 % (ref 0.3–6.2)
ERYTHROCYTE [DISTWIDTH] IN BLOOD BY AUTOMATED COUNT: 14.3 % (ref 11.7–13)
GFR SERPL CREATININE-BSD FRML MDRD: 48 ML/MIN/1.73
GLUCOSE BLD-MCNC: 88 MG/DL (ref 65–99)
HCT VFR BLD AUTO: 29.3 % (ref 35.6–45.5)
HGB BLD-MCNC: 9.4 G/DL (ref 11.9–15.5)
IMM GRANULOCYTES # BLD: 0 10*3/MM3 (ref 0–0.03)
IMM GRANULOCYTES NFR BLD: 0 % (ref 0–0.5)
LYMPHOCYTES # BLD AUTO: 1.21 10*3/MM3 (ref 0.9–4.8)
LYMPHOCYTES NFR BLD AUTO: 24.5 % (ref 19.6–45.3)
MAGNESIUM SERPL-MCNC: 2.1 MG/DL (ref 1.7–2.3)
MCH RBC QN AUTO: 30.6 PG (ref 26.9–32)
MCHC RBC AUTO-ENTMCNC: 32.1 G/DL (ref 32.4–36.3)
MCV RBC AUTO: 95.4 FL (ref 80.5–98.2)
MONOCYTES # BLD AUTO: 0.68 10*3/MM3 (ref 0.2–1.2)
MONOCYTES NFR BLD AUTO: 13.8 % (ref 5–12)
NEUTROPHILS # BLD AUTO: 2.9 10*3/MM3 (ref 1.9–8.1)
NEUTROPHILS NFR BLD AUTO: 58.9 % (ref 42.7–76)
PHOSPHATE SERPL-MCNC: 3.4 MG/DL (ref 2.5–4.5)
PLATELET # BLD AUTO: 266 10*3/MM3 (ref 140–500)
PMV BLD AUTO: 9.1 FL (ref 6–12)
POTASSIUM BLD-SCNC: 4.3 MMOL/L (ref 3.5–5.2)
RBC # BLD AUTO: 3.07 10*6/MM3 (ref 3.9–5.2)
SODIUM BLD-SCNC: 139 MMOL/L (ref 136–145)
WBC NRBC COR # BLD: 4.93 10*3/MM3 (ref 4.5–10.7)

## 2017-01-11 PROCEDURE — 83735 ASSAY OF MAGNESIUM: CPT | Performed by: INTERNAL MEDICINE

## 2017-01-11 PROCEDURE — 97161 PT EVAL LOW COMPLEX 20 MIN: CPT | Performed by: PHYSICAL THERAPIST

## 2017-01-11 PROCEDURE — 97110 THERAPEUTIC EXERCISES: CPT | Performed by: PHYSICAL THERAPIST

## 2017-01-11 PROCEDURE — 80048 BASIC METABOLIC PNL TOTAL CA: CPT | Performed by: INTERNAL MEDICINE

## 2017-01-11 PROCEDURE — 25010000003 CEFAZOLIN PER 500 MG: Performed by: INTERNAL MEDICINE

## 2017-01-11 PROCEDURE — 85025 COMPLETE CBC W/AUTO DIFF WBC: CPT | Performed by: INTERNAL MEDICINE

## 2017-01-11 PROCEDURE — 84100 ASSAY OF PHOSPHORUS: CPT | Performed by: INTERNAL MEDICINE

## 2017-01-11 PROCEDURE — 25010000002 ENOXAPARIN PER 10 MG: Performed by: INTERNAL MEDICINE

## 2017-01-11 RX ADMIN — LOSARTAN POTASSIUM 50 MG: 50 TABLET, FILM COATED ORAL at 09:22

## 2017-01-11 RX ADMIN — CYCLOSPORINE 1 DROP: 0.5 EMULSION OPHTHALMIC at 11:36

## 2017-01-11 RX ADMIN — HYDROCODONE BITARTRATE AND ACETAMINOPHEN 0.5 TABLET: 5; 325 TABLET ORAL at 06:05

## 2017-01-11 RX ADMIN — GABAPENTIN 300 MG: 300 CAPSULE ORAL at 18:40

## 2017-01-11 RX ADMIN — LATANOPROST 1 DROP: 50 SOLUTION OPHTHALMIC at 21:12

## 2017-01-11 RX ADMIN — CEFAZOLIN SODIUM 1 G: 1 INJECTION, SOLUTION INTRAVENOUS at 09:22

## 2017-01-11 RX ADMIN — GABAPENTIN 300 MG: 300 CAPSULE ORAL at 09:22

## 2017-01-11 RX ADMIN — POLYETHYLENE GLYCOL 3350 17 G: 17 POWDER, FOR SOLUTION ORAL at 09:22

## 2017-01-11 RX ADMIN — TRAMADOL HYDROCHLORIDE 50 MG: 50 TABLET, COATED ORAL at 21:11

## 2017-01-11 RX ADMIN — PRAMIPEXOLE DIHYDROCHLORIDE 0.25 MG: 0.25 TABLET ORAL at 21:11

## 2017-01-11 RX ADMIN — BACLOFEN 5 MG: 10 TABLET ORAL at 21:11

## 2017-01-11 RX ADMIN — HYDROCODONE BITARTRATE AND ACETAMINOPHEN 0.5 TABLET: 5; 325 TABLET ORAL at 18:41

## 2017-01-11 RX ADMIN — ENOXAPARIN SODIUM 40 MG: 40 INJECTION SUBCUTANEOUS at 09:22

## 2017-01-11 RX ADMIN — FAMOTIDINE 20 MG: 20 TABLET, FILM COATED ORAL at 09:22

## 2017-01-11 RX ADMIN — DOCUSATE SODIUM 250 MG: 250 CAPSULE, LIQUID FILLED ORAL at 21:12

## 2017-01-11 RX ADMIN — CEFAZOLIN SODIUM 1 G: 1 INJECTION, SOLUTION INTRAVENOUS at 02:30

## 2017-01-11 RX ADMIN — SENNOSIDES 17.2 MG: 8.6 TABLET, FILM COATED ORAL at 21:11

## 2017-01-11 RX ADMIN — HYDROCODONE BITARTRATE AND ACETAMINOPHEN 0.5 TABLET: 5; 325 TABLET ORAL at 13:53

## 2017-01-11 RX ADMIN — CEFAZOLIN SODIUM 1 G: 1 INJECTION, SOLUTION INTRAVENOUS at 21:11

## 2017-01-11 RX ADMIN — CILOSTAZOL 100 MG: 100 TABLET ORAL at 18:40

## 2017-01-11 RX ADMIN — POLYETHYLENE GLYCOL 3350 17 G: 17 POWDER, FOR SOLUTION ORAL at 18:41

## 2017-01-11 RX ADMIN — ASPIRIN 81 MG: 81 TABLET, CHEWABLE ORAL at 09:22

## 2017-01-11 RX ADMIN — CILOSTAZOL 100 MG: 100 TABLET ORAL at 09:22

## 2017-01-11 NOTE — PROGRESS NOTES
Discharge Planning Assessment  [facility]     Patient Name: Juliana Gardner  MRN: [id number]  Today's Date: 1/11/2017    Admit Date: 1/10/2017          Discharge Needs Assessment       01/11/17 1314    Living Environment    Lives With facility resident    Living Arrangements extended care facility    Provides Primary Care For no one    Primary Care Provided By other (see comments)   [facility]    Quality Of Family Relationships supportive    Able to Return to Prior Living Arrangements yes    Discharge Needs Assessment    Concerns To Be Addressed no discharge needs identified    Readmission Within The Last 30 Days no previous admission in last 30 days    Anticipated Changes Related to Illness none    Equipment Currently Used at Home walker, rolling;wheelchair, motorized    Equipment Needed After Discharge none    Discharge Facility/Level Of Care Needs nursing facility, intermediate;nursing facility, skilled    Transportation Available car;family or friend will provide            Discharge Plan       01/11/17 8508    Case Management/Social Work Plan    Plan return to [facility]    Patient/Family In Agreement With Plan yes    Additional Comments IMM letter signed 01/10/17: Spoke with Sister at Bedside. She lives at [facility] and plans to return at D/C. Call placed to [facility], spoke to Ailin/Nursing Pt is from  level of care, can return. Report and fax number obtained.  Packet on chart.          Discharge Placement     Facility/Agency Request Status Selected? Address Phone Number Fax Number    [facility] Accepted    [facility] [address] [facility] [id number] [phone]        Valerie Fitzgerald RN 1/11/2017 13:25    Spoke with Ailin - from  level of care                   Expected Discharge Date and Time     Expected Discharge Date Expected Discharge Time    Jan 13, 2017               Demographic Summary       01/11/17 1311    Referral Information    Admission  Type inpatient    Arrived From admitted as an inpatient    Referral Source admission list    Reason For Consult discharge planning    Record Reviewed history and physical;medical record    Contact Information    Permission Granted to Share Information With family/designee    Primary Care Physician Information    Name Wanda Noel            Functional Status       01/11/17 1311    Functional Status Current    Ambulation 3-->assistive equipment and person    Transferring 3-->assistive equipment and person    Toileting 3-->assistive equipment and person    Bathing 2-->assistive person    Dressing 2-->assistive person    Eating 0-->independent    Communication 0-->understands/communicates without difficulty    Change in Functional Status Since Onset of Current Illness/Injury no    Functional Status Prior    Ambulation 1-->assistive equipment    Transferring 0-->independent    Toileting 0-->independent    Bathing 2-->assistive person    Dressing 2-->assistive person    Eating 0-->independent    IADL    Medications assistive person    Meal Preparation completely dependent    Housekeeping completely dependent    Laundry completely dependent    Shopping completely dependent    Oral Care independent    IADL Comments Pt is from  level of [facility] at [facility]     Activity Tolerance    Current Activity Limitations none    Usual Activity Tolerance fair    Current Activity Tolerance fair    Cognitive/Perceptual/Developmental    Current Mental Status/Cognitive Functioning no deficits noted    Recent Changes in Mental Status/Cognitive Functioning unable to assess            Psychosocial     None            Abuse/Neglect     None            Legal     None            Substance Abuse     None            Patient Forms     None          Valerie Fitzgerald RN

## 2017-01-11 NOTE — PROGRESS NOTES
LOS: 1 day   Patient Care Team:  Wanda Noel MD as PCP - General (Internal Medicine)    Chief Complaint: Lower extremity edema with stasis ulcerations and cellulitis    Subjective     89 y.o. female with stasis ulcerations and cellulitis.  Her legs hurt last with the wraps and the antibiotics.  She is beginning every other day dressings with silver alginate.  We will check her wounds tomorrow.  Continue ace wraps twice a day.  She is aware that she needs to get up and walk when she is not walking she is to keep her legs elevated.    Review of Systems  Review of Systems - Negative except slightly disoriented this morning      Objective     Vital Signs  Temp:  [97.5 °F (36.4 °C)-97.9 °F (36.6 °C)] 97.6 °F (36.4 °C)  Heart Rate:  [76-81] 79  Resp:  [14-16] 16  BP: (121-140)/(51-65) 121/51    Physical Exam  General: No acute distress. Alert and oriented x 4  HEENT: No jugular venous distension, trachea is midline  CV: RRR, S1S2  Resp: Clear unlabored breathing on both sides  Abd: Abdomen is soft, nontender, nondistended  Extremities: Edema improved with Ace wraps.  We will change dressings in the a.m.    Results Review:       Recent Results (from the past 24 hour(s))   Basic Metabolic Panel    Collection Time: 01/10/17 12:32 PM   Result Value Ref Range    Glucose 95 65 - 99 mg/dL    BUN 16 8 - 23 mg/dL    Creatinine 1.16 (H) 0.57 - 1.00 mg/dL    Sodium 140 136 - 145 mmol/L    Potassium 4.5 3.5 - 5.2 mmol/L    Chloride 102 98 - 107 mmol/L    CO2 26.3 22.0 - 29.0 mmol/L    Calcium 9.1 8.2 - 9.6 mg/dL    eGFR Non African Amer 43 (L) >60 mL/min/1.73    BUN/Creatinine Ratio 13.8 7.0 - 25.0    Anion Gap 11.7 mmol/L   Magnesium    Collection Time: 01/10/17 12:32 PM   Result Value Ref Range    Magnesium 2.3 1.7 - 2.3 mg/dL   Phosphorus    Collection Time: 01/10/17 12:32 PM   Result Value Ref Range    Phosphorus 3.6 2.5 - 4.5 mg/dL   CBC Auto Differential    Collection Time: 01/10/17 12:32 PM   Result Value Ref Range     WBC 6.34 4.50 - 10.70 10*3/mm3    RBC 3.45 (L) 3.90 - 5.20 10*6/mm3    Hemoglobin 10.4 (L) 11.9 - 15.5 g/dL    Hematocrit 33.7 (L) 35.6 - 45.5 %    MCV 97.7 80.5 - 98.2 fL    MCH 30.1 26.9 - 32.0 pg    MCHC 30.9 (L) 32.4 - 36.3 g/dL    RDW 14.4 (H) 11.7 - 13.0 %    RDW-SD 51.2 37.0 - 54.0 fl    MPV 8.7 6.0 - 12.0 fL    Platelets 318 140 - 500 10*3/mm3    Neutrophil % 63.1 42.7 - 76.0 %    Lymphocyte % 18.9 (L) 19.6 - 45.3 %    Monocyte % 13.9 (H) 5.0 - 12.0 %    Eosinophil % 3.6 0.3 - 6.2 %    Basophil % 0.5 0.0 - 1.5 %    Immature Grans % 0.0 0.0 - 0.5 %    Neutrophils, Absolute 4.00 1.90 - 8.10 10*3/mm3    Lymphocytes, Absolute 1.20 0.90 - 4.80 10*3/mm3    Monocytes, Absolute 0.88 0.20 - 1.20 10*3/mm3    Eosinophils, Absolute 0.23 0.00 - 0.70 10*3/mm3    Basophils, Absolute 0.03 0.00 - 0.20 10*3/mm3    Immature Grans, Absolute 0.00 0.00 - 0.03 10*3/mm3   Basic Metabolic Panel    Collection Time: 01/11/17  7:17 AM   Result Value Ref Range    Glucose 88 65 - 99 mg/dL    BUN 17 8 - 23 mg/dL    Creatinine 1.06 (H) 0.57 - 1.00 mg/dL    Sodium 139 136 - 145 mmol/L    Potassium 4.3 3.5 - 5.2 mmol/L    Chloride 105 98 - 107 mmol/L    CO2 24.8 22.0 - 29.0 mmol/L    Calcium 8.5 8.2 - 9.6 mg/dL    eGFR Non African Amer 48 (L) >60 mL/min/1.73    BUN/Creatinine Ratio 16.0 7.0 - 25.0    Anion Gap 9.2 mmol/L   Magnesium    Collection Time: 01/11/17  7:17 AM   Result Value Ref Range    Magnesium 2.1 1.7 - 2.3 mg/dL   Phosphorus    Collection Time: 01/11/17  7:17 AM   Result Value Ref Range    Phosphorus 3.4 2.5 - 4.5 mg/dL   CBC Auto Differential    Collection Time: 01/11/17  7:17 AM   Result Value Ref Range    WBC 4.93 4.50 - 10.70 10*3/mm3    RBC 3.07 (L) 3.90 - 5.20 10*6/mm3    Hemoglobin 9.4 (L) 11.9 - 15.5 g/dL    Hematocrit 29.3 (L) 35.6 - 45.5 %    MCV 95.4 80.5 - 98.2 fL    MCH 30.6 26.9 - 32.0 pg    MCHC 32.1 (L) 32.4 - 36.3 g/dL    RDW 14.3 (H) 11.7 - 13.0 %    RDW-SD 49.8 37.0 - 54.0 fl    MPV 9.1 6.0 - 12.0 fL     Platelets 266 140 - 500 10*3/mm3    Neutrophil % 58.9 42.7 - 76.0 %    Lymphocyte % 24.5 19.6 - 45.3 %    Monocyte % 13.8 (H) 5.0 - 12.0 %    Eosinophil % 2.4 0.3 - 6.2 %    Basophil % 0.4 0.0 - 1.5 %    Immature Grans % 0.0 0.0 - 0.5 %    Neutrophils, Absolute 2.90 1.90 - 8.10 10*3/mm3    Lymphocytes, Absolute 1.21 0.90 - 4.80 10*3/mm3    Monocytes, Absolute 0.68 0.20 - 1.20 10*3/mm3    Eosinophils, Absolute 0.12 0.00 - 0.70 10*3/mm3    Basophils, Absolute 0.02 0.00 - 0.20 10*3/mm3    Immature Grans, Absolute 0.00 0.00 - 0.03 10*3/mm3   ]      Assessment/Plan           Active Problems:    Cellulitis of lower extremity    Venous stasis dermatitis of both lower extremities    Open leg wound    DNR (do not resuscitate)    Heart murmur    Cellulitis    Venous insufficiency of both lower extremities      Assessment & Plan  89 y.o. female improving with antibiotics and wound care.  Control the swelling will be paramount to her long-term ability to recover from these ulcers.  We will check her dressings tomorrow and follow with you.  Thanks again for excellent care      Bo Albrecht MD  01/11/17  8:09 AM

## 2017-01-11 NOTE — PROGRESS NOTES
Acute Care - Physical Therapy Initial Evaluation  [facility]     Patient Name: Juliana Gardner  : 1918  MRN: [id number]  Today's Date: 2017                Admit Date: 1/10/2017     Visit Dx:    ICD-10-CM ICD-9-CM   1. Difficulty walking R26.2 719.7     Patient Active Problem List   Diagnosis   • Cellulitis of lower extremity   • Venous stasis dermatitis of both lower extremities   • Open leg wound   • DNR (do not resuscitate)   • Heart murmur   • Cellulitis   • Venous insufficiency of both lower extremities     Past Medical History   Diagnosis Date   • Cellulitis of lower extremity 1/10/2017   • Gastritis    • GERD (gastroesophageal reflux disease)    • Hearing loss    • Heart murmur    • Hypertension    • Macular degeneration    • OA (osteoarthritis)    • Venous stasis dermatitis of both lower extremities 1/10/2017   • Vitamin D deficiency      Past Surgical History   Procedure Laterality Date   • Replacement total knee            PT ASSESSMENT (last 72 hours)      PT Evaluation       17 1617 17 1314    Rehab Evaluation    Document Type evaluation  -KH     Subjective Information agree to therapy;no complaints  -KH     Patient Effort, Rehab Treatment good  -KH     Symptoms Noted During/After Treatment fatigue  -KH     General Information    General Observations in bed, BLE wrapped  -KH     Precautions/Limitations fall precautions  -KH     Prior Level of Function independent:  -KH     Equipment Currently Used at Home walker, rolling  -KH walker, rolling;wheelchair, motorized  -LT    Plans/Goals Discussed With patient  -KH     Living Environment    Lives With  facility resident  -LT    Living Arrangements  extended care facility  -LT    Transportation Available  car;family or friend will provide  -LT    Clinical Impression    Patient/Family Goals Statement return home to [facility]  -     Criteria for Skilled Therapeutic Interventions Met yes;treatment indicated  -KH     Impairments Found (describe  specific impairments) gait, locomotion, and balance  -     Rehab Potential good, to achieve stated therapy goals  -     Pain Assessment    Pain Assessment 0-10  -     Pain Score 2  -     Pain Location Leg  -     Pain Orientation Right;Left  -     Pain Intervention(s) Repositioned;Ambulation/increased activity  -     Cognitive Assessment/Intervention    Current Cognitive/Communication Assessment functional  -     Orientation Status oriented x 4  -     Follows Commands/Answers Questions 100% of the time  -     Personal Safety WNL/WFL  -     Personal Safety Interventions fall prevention program maintained;gait belt  -     ROM (Range of Motion)    General ROM Detail [facility]     MMT (Manual Muscle Testing)    General MMT Assessment Detail [facility]  -     Bed Mobility, Assessment/Treatment    Bed Mobility, Assistive Device bed rails  -     Bed Mob, Supine to Sit, [place] contact guard assist  -     Transfer Assessment/Treatment    Transfers, Sit-Stand [place] minimum assist (75% patient effort)  -     Transfers, Stand-Sit [place] minimum assist (75% patient effort)  -     Transfers, Sit-Stand-Sit, Assist Device rolling walker  -     Gait Assessment/Treatment    Gait, [place] Level minimum assist (75% patient effort)  -     Gait, Assistive Device rolling walker  -     Gait, Distance (Feet) 15  -     Gait, Gait Deviations leda decreased;decreased heel strike;forward flexed posture;step length decreased;narrow base  -     Gait, Safety Issues balance decreased during turns;step length decreased;loses balance backward  -     Gait, Impairments strength decreased;impaired balance  -     Positioning and Restraints    Pre-Treatment Position in bed  -     Post Treatment Position chair  -     In Chair sitting;call light within reach;encouraged to call for assist;exit alarm on;notified kulwinder Porras       01/11/17 0844 01/10/17 1159    Rehab Evaluation    Evaluation  Not Performed patient unavailable for evaluation   Bathing. Will follow up later today  -     Living Environment    Transportation Available  car;family or friend will provide  -SB      01/10/17 1511 01/10/17 1210    Rehab Evaluation    Evaluation Not Performed patient unavailable for evaluation   With wound care RN.  -     Living Environment    Lives With  facility resident  -SB    Living Arrangements  assisted living  -SB    Home Accessibility  no concerns  -SB    Stair Railings at Home  none  -SB    Type of Financial/Environmental Concern  none  -SB    Transportation Available  car;family or friend will provide  -SB      01/10/17 1129       General Information    Equipment Currently Used at Home walker, rolling;wheelchair, motorized  -SB       User Key  (r) = Recorded By, (t) = Taken By, (c) = Cosigned By    Initials Name Provider Type    DIMA Bowman, SUNNY Physical Therapist    LT Valerie Fitzgerald, JAKOB Case Manager     Suni Noel RN Registered Nurse          Physical Therapy Education     Title: PT OT SLP Therapies (Active)     Topic: Physical Therapy (Active)     Point: Mobility training (Done)    Learning Progress Summary    Learner Readiness Method Response Comment Documented by Status   Patient Acceptance E [facility] 01/11/17 1623 Done               Point: Home exercise program (Done)    Learning Progress Summary    Learner Readiness Method Response Comment Documented by Status   Patient Acceptance E [facility] 01/11/17 1623 Done               Point: Body mechanics (Done)    Learning Progress Summary    Learner Readiness Method Response Comment Documented by Status   Patient Acceptance E [facility] 01/11/17 1623 Done                      User Key     Initials Effective Dates Name Provider Type Discipline     05/18/15 -  Nae Bowman, PT Physical Therapist PT                PT Recommendation and Plan  Anticipated Discharge Disposition: extended care facility  Planned Therapy Interventions:  balance training, bed mobility training, gait training, home exercise program, strengthening, transfer training, patient/family education  PT Frequency: daily  Plan of Care Review  Plan Of Care Reviewed With: patient  Progress: improving  Outcome Summary/Follow up Plan: Pt presents with generalized weakness, Impaired balance, decreased endurance and unsteady gait. Pt would benefit from PT to address these impairments.          IP PT Goals       01/11/17 1623          Bed Mobility PT LTG    Bed Mobility PT LTG, Date Established 01/11/17  -KH      Bed Mobility PT LTG, Time to Achieve 1 wk  -KH      Bed Mobility PT LTG, Activity Type all bed mobility  -KH      Bed Mobility PT LTG, [place] Level supervision required  -KH      Transfer Training PT LTG    Transfer Training PT LTG, Date Established 01/11/17  -KH      Transfer Training PT LTG, Time to Achieve 1 wk  -KH      Transfer Training PT LTG, Activity Type all transfers  -KH      Transfer Training PT LTG, [place] Level supervision required  -KH      Transfer Training PT LTG, Assist Device walker, rolling  -KH      Gait Training PT LTG    Gait Training Goal PT LTG, Date Established 01/11/17  -KH      Gait Training Goal PT LTG, Time to Achieve 1 wk  -KH      Gait Training Goal PT LTG, [place] Level supervision required  -KH      Gait Training Goal PT LTG, Assist Device walker, rolling  -KH      Gait Training Goal PT LTG, Distance to Achieve 150 ft  -KH      Patient Education PT LTG    Patient Education PT LTG, Date Established 01/11/17  -KH      Patient Education PT LTG, Time to Achieve 1 wk  -KH      Patient Education PT LTG, Education Type HEP  -KH      Patient Education PT LTG, Education Understanding demonstrate adequately  -KH        User Key  (r) = Recorded By, (t) = Taken By, (c) = Cosigned By    Initials Name Provider Type    DIMA Bowman, PT Physical Therapist                Outcome Measures       01/11/17 1626          How much  help from another person do you currently need...    Turning from your back to your side while in flat bed without using bedrails? 3  -KH      Moving from lying on back to sitting on the side of a flat bed without bedrails? 3  -KH      Moving to and from a bed to a chair (including a wheelchair)? 3  -KH      Standing up from a chair using your arms (e.g., wheelchair, bedside chair)? 3  -KH      Climbing 3-5 steps with a railing? 2  -KH      To walk in hospital room? 3  -KH      AM-PAC 6 Clicks Score 17  -KH      Functional Assessment    Outcome Measure Options AM-PAC 6 Clicks Basic Mobility (PT)  -        User Key  (r) = Recorded By, (t) = Taken By, (c) = Cosigned By    Initials Name Provider Type    DIMA Bowman, PT Physical Therapist           Time Calculation:         PT Charges       01/11/17 1626 01/11/17 0844       Time Calculation    Start Time 1602  -KH      Stop Time 1620  -KH      Time Calculation (min) 18 min  -KH      PT Received On 01/11/17  -DIMA      PT - Next Appointment 01/12/17  -KH 01/11/17  -     PT Goal Re-Cert Due Date 01/18/17  -        User Key  (r) = Recorded By, (t) = Taken By, (c) = Cosigned By    Initials Name Provider Type    DIMA Bowman, PT Physical Therapist          Therapy Charges for Today     Code Description Service Date Service Provider Modifiers Qty    17992360171 HC PT EVAL LOW COMPLEXITY 1 1/11/2017 Nae Bowman, PT GP 1    69407957795 HC PT THER PROC EA 15 MIN 1/11/2017 Nae Bowman, PT GP 1          PT G-Codes  Outcome Measure Options: AM-PAC 6 Clicks Basic Mobility (PT)      Nae Bowman, PT  1/11/2017

## 2017-01-11 NOTE — PLAN OF CARE
Problem: Patient Care Overview (Adult)  Goal: Plan of Care Review  Outcome: Ongoing (interventions implemented as appropriate)    01/11/17 8427   Coping/Psychosocial Response Interventions   Plan Of Care Reviewed With patient   Patient Care Overview   Progress improving   Outcome Evaluation   Outcome Summary/Follow up Plan pt pain has decreased today with the scheduled pain medication. pt ace wraps rewrapped on legs. pt presents wth generlized weakness. was able to get up to chair with physical therapy. pt is currently resting in chair.        Goal: Adult Individualization and Mutuality  Outcome: Ongoing (interventions implemented as appropriate)  Goal: Discharge Needs Assessment  Outcome: Ongoing (interventions implemented as appropriate)    Problem: Fall Risk (Adult)  Goal: Absence of Falls  Outcome: Ongoing (interventions implemented as appropriate)    Problem: Skin Integrity Impairment, Risk/Actual (Adult)  Goal: Skin Integrity/Wound Healing  Outcome: Ongoing (interventions implemented as appropriate)    Problem: Pain, Acute (Adult)  Goal: Acceptable Pain Control/Comfort Level  Outcome: Ongoing (interventions implemented as appropriate)

## 2017-01-11 NOTE — PLAN OF CARE
Problem: Patient Care Overview (Adult)  Goal: Plan of Care Review  Outcome: Ongoing (interventions implemented as appropriate)    01/11/17 1623   Coping/Psychosocial Response Interventions   Plan Of Care Reviewed With patient   Outcome Evaluation   Outcome Summary/Follow up Plan Pt presents with generalized weakness, Impaired balance, decreased endurance and unsteady gait. Pt would benefit from PT to address these impairments.         Problem: Inpatient Physical Therapy  Goal: Bed Mobility Goal LTG- PT    01/11/17 1623   Bed Mobility PT LTG   Bed Mobility PT LTG, Date Established 01/11/17   Bed Mobility PT LTG, Time to Achieve 1 wk   Bed Mobility PT LTG, Activity Type all bed mobility   Bed Mobility PT LTG, [place] Level supervision required       Goal: Transfer Training Goal 1 LTG- PT    01/11/17 1623   Transfer Training PT LTG   Transfer Training PT LTG, Date Established 01/11/17   Transfer Training PT LTG, Time to Achieve 1 wk   Transfer Training PT LTG, Activity Type all transfers   Transfer Training PT LTG, [place] Level supervision required   Transfer Training PT LTG, Assist Device walker, rolling       Goal: Gait Training Goal LTG- PT    01/11/17 1623   Gait Training PT LTG   Gait Training Goal PT LTG, Date Established 01/11/17   Gait Training Goal PT LTG, Time to Achieve 1 wk   Gait Training Goal PT LTG, [place] Level supervision required   Gait Training Goal PT LTG, Assist Device walker, rolling   Gait Training Goal PT LTG, Distance to Achieve 150 ft       Goal: Patient Education Goal LTG- PT    01/11/17 1623   Patient Education PT LTG   Patient Education PT LTG, Date Established 01/11/17   Patient Education PT LTG, Time to Achieve 1 wk   Patient Education PT LTG, Education Type HEP   Patient Education PT LTG, Education Understanding demonstrate adequately

## 2017-01-11 NOTE — PLAN OF CARE
Problem: Patient Care Overview (Adult)  Goal: Plan of Care Review  Outcome: Ongoing (interventions implemented as appropriate)    01/11/17 0437   Coping/Psychosocial Response Interventions   Plan Of Care Reviewed With patient   Patient Care Overview   Progress improving   Outcome Evaluation   Outcome Summary/Follow up Plan pt slept most of the night. complained of pain one time, meds given with relief. pt lgs elevated. no sign of distress       Goal: Adult Individualization and Mutuality  Outcome: Ongoing (interventions implemented as appropriate)  Goal: Discharge Needs Assessment  Outcome: Ongoing (interventions implemented as appropriate)    Problem: Fall Risk (Adult)  Goal: Absence of Falls  Outcome: Ongoing (interventions implemented as appropriate)    Problem: Skin Integrity Impairment, Risk/Actual (Adult)  Goal: Skin Integrity/Wound Healing  Outcome: Ongoing (interventions implemented as appropriate)    Problem: Pain, Acute (Adult)  Goal: Acceptable Pain Control/Comfort Level  Outcome: Ongoing (interventions implemented as appropriate)

## 2017-01-11 NOTE — PROGRESS NOTES
LOS: 1 day     Name: Juliana Garnder  Age/Sex: 89 y.o. female  :  1918        PCP: Wanda Noel MD    Subjective   She is feeling a little better today but was having some significant leg pain overnight.  Overall improvement.  Denies any fevers or chills.  General: No Fever or Chills, Cardiac: No Chest Pain or Palpitations, Resp: No Cough or SOA, GI: No Nausea, Vomiting, or Diarrhea and Other: No bleeding      aspirin 81 mg Oral Daily   baclofen 5 mg Oral Nightly   ceFAZolin 1 g Intravenous Q8H   cilostazol 100 mg Oral BID   cycloSPORINE 1 drop Both Eyes Q12H   docusate sodium 250 mg Oral Nightly   enoxaparin 40 mg Subcutaneous Q24H   famotidine 20 mg Oral Daily   gabapentin 300 mg Oral BID   HYDROcodone-acetaminophen 0.5 tablet Oral Q6H   latanoprost 1 drop Both Eyes Nightly   losartan 50 mg Oral Daily   polyethylene glycol 17 g Oral BID   pramipexole 0.25 mg Oral Nightly   senna 2 tablet Oral Nightly   traMADol 50 mg Oral Nightly          Objective   Vital Signs  Temp:  [97.5 °F (36.4 °C)-97.9 °F (36.6 °C)] 97.6 °F (36.4 °C)  Heart Rate:  [76-81] 79  Resp:  [14-16] 16  BP: (121-140)/(51-65) 121/51  Body mass index is 20.97 kg/(m^2).    Intake/Output Summary (Last 24 hours) at 17 0840  Last data filed at 17 0230   Gross per 24 hour   Intake    220 ml   Output      0 ml   Net    220 ml       General:  Resting comfortably no active distress  Eyes:  PERRL; no conj pallor; EOMI; no scleral icterus  ENT:  oral mucosa moist; pharynx w/o exudate; ext inspect WNL  Neck: nl movement; no JVD; no adenopathy; thyroid WNL  Lungs:  CTA; good air entry; Non labored; No wheeze, No Crackle, No Rhonchi  Cardiac:  RRR 3/6murmur;  nl S1/S2  Abd:  soft; non tender; non distended; no HSM; no masses; BS +  : deferred  Musc/Skel: no arthropathy,bilateral lower extremities appear improved compared to Dr. Pratt's evaluation yesterday.   Skin:  warm and perfused; no apparent rash on examined areas  Neuro: CN  grossly intact; no focal deficit of strength or sensory   Ext/P Vasc:  peripheral pulses 2+; no clubbing ; no cyanosis; no edema  MS & Psychiatric: A&O x  3; memory intact; affect/mood appropriate    Results Review:       I reviewed the patient's new clinical results.    Results from last 7 days  Lab Units 01/11/17  0717 01/10/17  1232   WBC 10*3/mm3 4.93 6.34   HEMOGLOBIN g/dL 9.4* 10.4*   PLATELETS 10*3/mm3 266 318     Results from last 7 days  Lab Units 01/11/17  0717 01/10/17  1232   SODIUM mmol/L 139 140   POTASSIUM mmol/L 4.3 4.5   CHLORIDE mmol/L 105 102   TOTAL CO2 mmol/L 24.8 26.3   BUN mg/dL 17 16   CREATININE mg/dL 1.06* 1.16*   CALCIUM mg/dL 8.5 9.1   MAGNESIUM mg/dL 2.1 2.3   PHOSPHORUS mg/dL 3.4 3.6   Estimated Creatinine Clearance: 27.6 mL/min (by C-G formula based on Cr of 1.06).      Assessment/Plan   Active Problems:    Cellulitis of lower extremity    Venous stasis dermatitis of both lower extremities    Open leg wound    DNR (do not resuscitate)    Heart murmur    Cellulitis    Venous insufficiency of both lower extremities      PLAN  1. Bilateral lower extremity cellulitis: Continues to improve on IV Cefizolin if remains afebrile consider change to oral antibiotics  2. Venous stasis dermatitis bilateral lower extremities:  Continue wound care  3. Venous stasis ulcerations:  Wound care is evaluating appreciated vascular's input.  Continue wraps manage edema  4. Heart murmur:Chronic issue no acute workup indicated  5. chronic kidney disease: Stable    Disposition        Ash Chase MD  [facility]  01/11/17  8:40 AM      EMR Dragon/Transcription disclaimer:     Much of this encounter note is an electronic transcription/translation of spoken language to printed text. The electronic translation of spoken language may permit erroneous, or at times, nonsensical words or phrases to be inadvertently transcribed; Although I have reviewed the note for such errors, some may  still exist.

## 2017-01-12 LAB
ANION GAP SERPL CALCULATED.3IONS-SCNC: 10.1 MMOL/L
BASOPHILS # BLD AUTO: 0.02 10*3/MM3 (ref 0–0.2)
BASOPHILS NFR BLD AUTO: 0.5 % (ref 0–1.5)
BUN BLD-MCNC: 17 MG/DL (ref 8–23)
BUN/CREAT SERPL: 18.1 (ref 7–25)
CALCIUM SPEC-SCNC: 8.6 MG/DL (ref 8.2–9.6)
CHLORIDE SERPL-SCNC: 105 MMOL/L (ref 98–107)
CO2 SERPL-SCNC: 25.9 MMOL/L (ref 22–29)
CREAT BLD-MCNC: 0.94 MG/DL (ref 0.57–1)
DEPRECATED RDW RBC AUTO: 51.4 FL (ref 37–54)
EOSINOPHIL # BLD AUTO: 0.23 10*3/MM3 (ref 0–0.7)
EOSINOPHIL NFR BLD AUTO: 5.2 % (ref 0.3–6.2)
ERYTHROCYTE [DISTWIDTH] IN BLOOD BY AUTOMATED COUNT: 14.2 % (ref 11.7–13)
GFR SERPL CREATININE-BSD FRML MDRD: 55 ML/MIN/1.73
GLUCOSE BLD-MCNC: 92 MG/DL (ref 65–99)
HCT VFR BLD AUTO: 31.4 % (ref 35.6–45.5)
HGB BLD-MCNC: 10.1 G/DL (ref 11.9–15.5)
IMM GRANULOCYTES # BLD: 0 10*3/MM3 (ref 0–0.03)
IMM GRANULOCYTES NFR BLD: 0 % (ref 0–0.5)
LYMPHOCYTES # BLD AUTO: 1.17 10*3/MM3 (ref 0.9–4.8)
LYMPHOCYTES NFR BLD AUTO: 26.6 % (ref 19.6–45.3)
MAGNESIUM SERPL-MCNC: 2.2 MG/DL (ref 1.7–2.3)
MCH RBC QN AUTO: 31.6 PG (ref 26.9–32)
MCHC RBC AUTO-ENTMCNC: 32.2 G/DL (ref 32.4–36.3)
MCV RBC AUTO: 98.1 FL (ref 80.5–98.2)
MONOCYTES # BLD AUTO: 0.71 10*3/MM3 (ref 0.2–1.2)
MONOCYTES NFR BLD AUTO: 16.1 % (ref 5–12)
NEUTROPHILS # BLD AUTO: 2.27 10*3/MM3 (ref 1.9–8.1)
NEUTROPHILS NFR BLD AUTO: 51.6 % (ref 42.7–76)
PHOSPHATE SERPL-MCNC: 3.4 MG/DL (ref 2.5–4.5)
PLATELET # BLD AUTO: 299 10*3/MM3 (ref 140–500)
PMV BLD AUTO: 9 FL (ref 6–12)
POTASSIUM BLD-SCNC: 4.3 MMOL/L (ref 3.5–5.2)
RBC # BLD AUTO: 3.2 10*6/MM3 (ref 3.9–5.2)
SODIUM BLD-SCNC: 141 MMOL/L (ref 136–145)
WBC NRBC COR # BLD: 4.4 10*3/MM3 (ref 4.5–10.7)

## 2017-01-12 PROCEDURE — 83735 ASSAY OF MAGNESIUM: CPT | Performed by: INTERNAL MEDICINE

## 2017-01-12 PROCEDURE — 80048 BASIC METABOLIC PNL TOTAL CA: CPT | Performed by: INTERNAL MEDICINE

## 2017-01-12 PROCEDURE — 84100 ASSAY OF PHOSPHORUS: CPT | Performed by: INTERNAL MEDICINE

## 2017-01-12 PROCEDURE — 25010000002 ENOXAPARIN PER 10 MG: Performed by: HOSPITALIST

## 2017-01-12 PROCEDURE — 25010000003 CEFAZOLIN PER 500 MG: Performed by: INTERNAL MEDICINE

## 2017-01-12 PROCEDURE — 85025 COMPLETE CBC W/AUTO DIFF WBC: CPT | Performed by: INTERNAL MEDICINE

## 2017-01-12 PROCEDURE — 97110 THERAPEUTIC EXERCISES: CPT

## 2017-01-12 RX ADMIN — CILOSTAZOL 100 MG: 100 TABLET ORAL at 18:03

## 2017-01-12 RX ADMIN — HYDROCODONE BITARTRATE AND ACETAMINOPHEN 0.5 TABLET: 5; 325 TABLET ORAL at 00:42

## 2017-01-12 RX ADMIN — CILOSTAZOL 100 MG: 100 TABLET ORAL at 08:39

## 2017-01-12 RX ADMIN — CEFAZOLIN SODIUM 1 G: 1 INJECTION, SOLUTION INTRAVENOUS at 20:33

## 2017-01-12 RX ADMIN — BACLOFEN 5 MG: 10 TABLET ORAL at 20:25

## 2017-01-12 RX ADMIN — LATANOPROST 1 DROP: 50 SOLUTION OPHTHALMIC at 20:25

## 2017-01-12 RX ADMIN — HYDROCODONE BITARTRATE AND ACETAMINOPHEN 0.5 TABLET: 5; 325 TABLET ORAL at 18:03

## 2017-01-12 RX ADMIN — PRAMIPEXOLE DIHYDROCHLORIDE 0.25 MG: 0.25 TABLET ORAL at 20:25

## 2017-01-12 RX ADMIN — SENNOSIDES 17.2 MG: 8.6 TABLET, FILM COATED ORAL at 20:24

## 2017-01-12 RX ADMIN — GABAPENTIN 300 MG: 300 CAPSULE ORAL at 08:38

## 2017-01-12 RX ADMIN — TRAMADOL HYDROCHLORIDE 50 MG: 50 TABLET, COATED ORAL at 20:25

## 2017-01-12 RX ADMIN — POLYETHYLENE GLYCOL 3350 17 G: 17 POWDER, FOR SOLUTION ORAL at 08:39

## 2017-01-12 RX ADMIN — CEFAZOLIN SODIUM 1 G: 1 INJECTION, SOLUTION INTRAVENOUS at 10:28

## 2017-01-12 RX ADMIN — DOCUSATE SODIUM 250 MG: 250 CAPSULE, LIQUID FILLED ORAL at 20:25

## 2017-01-12 RX ADMIN — ENOXAPARIN SODIUM 30 MG: 30 INJECTION SUBCUTANEOUS at 08:38

## 2017-01-12 RX ADMIN — GABAPENTIN 300 MG: 300 CAPSULE ORAL at 18:03

## 2017-01-12 RX ADMIN — FAMOTIDINE 20 MG: 20 TABLET, FILM COATED ORAL at 08:39

## 2017-01-12 RX ADMIN — HYDROCODONE BITARTRATE AND ACETAMINOPHEN 0.5 TABLET: 5; 325 TABLET ORAL at 06:54

## 2017-01-12 RX ADMIN — LOSARTAN POTASSIUM 50 MG: 50 TABLET, FILM COATED ORAL at 08:38

## 2017-01-12 RX ADMIN — CYCLOSPORINE 1 DROP: 0.5 EMULSION OPHTHALMIC at 20:24

## 2017-01-12 RX ADMIN — CYCLOSPORINE 1 DROP: 0.5 EMULSION OPHTHALMIC at 08:38

## 2017-01-12 RX ADMIN — ASPIRIN 81 MG: 81 TABLET, CHEWABLE ORAL at 08:39

## 2017-01-12 NOTE — PROGRESS NOTES
Acute Care - Physical Therapy Treatment Note  [facility]     Patient Name: Juliana Gardner  : 1918  MRN: [id number]  Today's Date: 2017             Admit Date: 1/10/2017    Visit Dx:    ICD-10-CM ICD-9-CM   1. Difficulty walking R26.2 719.7     Patient Active Problem List   Diagnosis   • Cellulitis of lower extremity   • Venous stasis dermatitis of both lower extremities   • Open leg wound   • DNR (do not resuscitate)   • Heart murmur   • Cellulitis   • Venous insufficiency of both lower extremities               Adult Rehabilitation Note       17 1646          Rehab Assessment/Intervention    Discipline physical therapy assistant  -      Document Type therapy note (daily note)  -      Subjective Information agree to therapy  -      Symptoms Noted During/After Treatment increased pain  -      Precautions/Limitations fall precautions   skin integrity  -JW      Recorded by [HOLLI] Katherin Soni PTA      Pain Assessment    Pain Assessment FLACC  -      Pain Score 5  -JW      Pain Location Leg  -JW      Pain Orientation Left  -      Pain Intervention(s) Repositioned  -JW      Recorded by [HOLLI] Katherin Soni PTA      Cognitive Assessment/Intervention    Personal Safety Interventions fall prevention program maintained;gait belt  -JW      Recorded by [HOLLI] Katherin Soni PTA      Gait Assessment/Treatment    Gait, [place] Level minimum assist (75% patient effort);2 person assist required  -      Gait, Distance (Feet) 15  -JW      Gait, Gait Deviations leda decreased;decreased heel strike;step length decreased;forward flexed posture  -JW      Recorded by [HOLLI] Katherin Soni PTA      Positioning and Restraints    Pre-Treatment Position in bed  -      Post Treatment Position bed  -JW      In Bed supine;call light within reach;encouraged to call for assist;exit alarm on  -JW      Recorded by [HOLLI] Katherin Soni PTA        User Key  (r) = Recorded By, (t) = Taken By, (c)  = Cosigned By    Initials Name Effective Dates    JW Katherin Soni, PTA 02/18/16 -                 IP PT Goals       01/11/17 1623          Bed Mobility PT LTG    Bed Mobility PT LTG, Date Established 01/11/17  -KH      Bed Mobility PT LTG, Time to Achieve 1 wk  -KH      Bed Mobility PT LTG, Activity Type all bed mobility  -KH      Bed Mobility PT LTG, [place] Level supervision required  -KH      Transfer Training PT LTG    Transfer Training PT LTG, Date Established 01/11/17  -KH      Transfer Training PT LTG, Time to Achieve 1 wk  -KH      Transfer Training PT LTG, Activity Type all transfers  -KH      Transfer Training PT LTG, [place] Level supervision required  -KH      Transfer Training PT LTG, Assist Device walker, rolling  -KH      Gait Training PT LTG    Gait Training Goal PT LTG, Date Established 01/11/17  -KH      Gait Training Goal PT LTG, Time to Achieve 1 wk  -KH      Gait Training Goal PT LTG, [place] Level supervision required  -KH      Gait Training Goal PT LTG, Assist Device walker, rolling  -KH      Gait Training Goal PT LTG, Distance to Achieve 150 ft  -KH      Patient Education PT LTG    Patient Education PT LTG, Date Established 01/11/17  -KH      Patient Education PT LTG, Time to Achieve 1 wk  -KH      Patient Education PT LTG, Education Type HEP  -KH      Patient Education PT LTG, Education Understanding demonstrate adequately  -KH        User Key  (r) = Recorded By, (t) = Taken By, (c) = Cosigned By    Initials Name Provider Type    DIMA Bowman, PT Physical Therapist          Physical Therapy Education     Title: PT OT SLP Therapies (Active)     Topic: Physical Therapy (Active)     Point: Mobility training (Done)    Learning Progress Summary    Learner Readiness Method Response Comment Documented by Status   Patient Acceptance E CALI  JW 01/12/17 1651 Done    Acceptance E CALI CH 01/11/17 1623 Done               Point: Home exercise program (Done)    Learning  Progress Summary    Learner Readiness Method Response Comment Documented by Status   Patient Acceptance E [facility] 01/11/17 1623 Done               Point: Body mechanics (Done)    Learning Progress Summary    Learner Readiness Method Response Comment Documented by Status   Patient Acceptance E [facility] 01/11/17 1623 Done                      User Key     Initials Effective Dates Name Provider Type [facility] 05/18/15 -  Nae Bowman, PT Physical Therapist PT    HOLLI 02/18/16 -  Katherin Soni PTA Physical Therapy Assistant PT                    PT Recommendation and Plan  Anticipated Discharge Disposition: extended care facility  Planned Therapy Interventions: balance training, bed mobility training, gait training, home exercise program, strengthening, transfer training, patient/family education  PT Frequency: daily  Plan of Care Review  Plan Of Care Reviewed With: patient  Progress: progress towards functional goals is fair  Outcome Summary/Follow up Plan: tolerating leg wrap changes well. much progress noted in legs.           Outcome Measures       01/12/17 1600 01/11/17 1626       How much help from another person do you currently need...    Turning from your back to your side while in flat bed without using bedrails? 3  - 3  -KH     Moving from lying on back to sitting on the side of a flat bed without bedrails? 3  - 3  -KH     Moving to and from a bed to a chair (including a wheelchair)? 3  - 3  -KH     Standing up from a chair using your arms (e.g., wheelchair, bedside chair)? 3  - 3  -KH     Climbing 3-5 steps with a railing? 2  - 2  -KH     To walk in hospital room? 3  - 3  -KH     AM-PAC 6 Clicks Score 17  - 17  -     Functional Assessment    Outcome Measure Options  AM-PAC 6 Clicks Basic Mobility (PT)  -KH       User Key  (r) = Recorded By, (t) = Taken By, (c) = Cosigned By    Initials Name Provider Type    DIMA Bowman, PT Physical Therapist    HOLLI VALENTIN  TRAM Soni Physical Therapy Assistant           Time Calculation:         PT Charges       01/12/17 1644          Time Calculation    Start Time 1625  -      Stop Time 1640  -HOLLI      Time Calculation (min) 15 min  -JW      PT Received On 01/12/17  -HOLLI      PT - Next Appointment 01/13/17  -HOLLI        User Key  (r) = Recorded By, (t) = Taken By, (c) = Cosigned By    Initials Name Provider Type     Katherin Soni PTA Physical Therapy Assistant          Therapy Charges for Today     Code Description Service Date Service Provider Modifiers Qty    64179598462 HC PT THER PROC EA 15 MIN 1/12/2017 Katherin Soni PTA GP 1    93013660204 HC PT THER SUPP EA 15 MIN 1/12/2017 Katherin Soni PTA GP 1          PT G-Codes  Outcome Measure Options: AM-PAC 6 Clicks Basic Mobility (PT)    Katherin Soni PTA  1/12/2017

## 2017-01-12 NOTE — PLAN OF CARE
Problem: Patient Care Overview (Adult)  Goal: Plan of Care Review  Outcome: Ongoing (interventions implemented as appropriate)    01/12/17 1522   Outcome Evaluation   Outcome Summary/Follow up Plan tolerating leg wrap changes well. much progress noted in legs.        Goal: Adult Individualization and Mutuality  Outcome: Ongoing (interventions implemented as appropriate)  Goal: Discharge Needs Assessment  Outcome: Ongoing (interventions implemented as appropriate)    Problem: Fall Risk (Adult)  Goal: Absence of Falls  Outcome: Ongoing (interventions implemented as appropriate)    Problem: Skin Integrity Impairment, Risk/Actual (Adult)  Goal: Skin Integrity/Wound Healing  Outcome: Ongoing (interventions implemented as appropriate)    Problem: Pain, Acute (Adult)  Goal: Acceptable Pain Control/Comfort Level  Outcome: Ongoing (interventions implemented as appropriate)

## 2017-01-12 NOTE — PROGRESS NOTES
LOS: 2 days   Patient Care Team:  Wanda Noel MD as PCP - General (Internal Medicine)    Chief Complaint: Lower extremity edema with stasis ulcerations and cellulitis    Subjective     89 y.o. female with stasis ulcerations and cellulitis.  Her legs hurt last with the wraps and the antibiotics.  She is getting every other day dressings with silver alginate.  Check of wounds today shows significant improvement in edema and stable wounds that I believe are starting to improve.  Continue ace wraps twice a day.  She is aware that she needs to get up and walk when she is not walking she is to keep her legs elevated.    Review of Systems  Review of Systems - Negative except slightly disoriented this morning      Objective     Vital Signs  Temp:  [97.9 °F (36.6 °C)-98.4 °F (36.9 °C)] 97.9 °F (36.6 °C)  Heart Rate:  [74-79] 74  Resp:  [18-20] 20  BP: (121-132)/(53-73) 129/61    Physical Exam  General: No acute distress. Alert and oriented x 4  HEENT: No jugular venous distension, trachea is midline  CV: RRR, S1S2  Resp: Clear unlabored breathing on both sides  Abd: Abdomen is soft, nontender, nondistended  Extremities: Edema improved with Ace wraps.  Wound still with a exudate.  Erythema of the shin without significant change.  Difficult to determine if this is cellulitis versus chronic skin injury.  We'll continue antibiotics and dressings.    Results Review:       No results found for this or any previous visit (from the past 24 hour(s)).]      Assessment/Plan           Active Problems:    Cellulitis of lower extremity    Venous stasis dermatitis of both lower extremities    Open leg wound    DNR (do not resuscitate)    Heart murmur    Cellulitis    Venous insufficiency of both lower extremities      Assessment & Plan  89 y.o. female improving with antibiotics and wound care.  Control the swelling will be paramount to her long-term ability to recover from these ulcers.  Dressings were changed today with  significant improvement in edema.  Awaiting improvement and ulcers.  Skin injury is diffuse.  We will order physical therapy to make sure she is moving but when she is not under therapy she'll need to keep her legs elevated  Thanks again for excellent care      Bo Albrecht MD  01/12/17  7:55 AM

## 2017-01-12 NOTE — NURSING NOTE
WOCN dressing change venous stasis ulcers. Cleanse with saline applied moist hydrofiber silver dressing. Every other day.  Cover Kerlix and ace wraps. Decrease edema and erythema. Wounds improvement.  Continue to change and apply ace wraps 2 times per day.

## 2017-01-12 NOTE — PLAN OF CARE
Problem: Patient Care Overview (Adult)  Goal: Plan of Care Review  Outcome: Ongoing (interventions implemented as appropriate)    01/12/17 0415   Coping/Psychosocial Response Interventions   Plan Of Care Reviewed With patient   Patient Care Overview   Progress improving   Outcome Evaluation   Outcome Summary/Follow up Plan pt has bilateral legs wrapped with ace wraps, states pain better controlled but still having more pain than usual, uses walker with 1 assist to ambulate, ace wraps are to be replaced Q12 h, wound care 1x every other day, elevate legs, meds with applesauce, not slept much this shift       Goal: Adult Individualization and Mutuality  Outcome: Ongoing (interventions implemented as appropriate)    Problem: Fall Risk (Adult)  Goal: Absence of Falls  Outcome: Ongoing (interventions implemented as appropriate)    Problem: Skin Integrity Impairment, Risk/Actual (Adult)  Goal: Skin Integrity/Wound Healing  Outcome: Ongoing (interventions implemented as appropriate)    Problem: Pain, Acute (Adult)  Goal: Acceptable Pain Control/Comfort Level  Outcome: Ongoing (interventions implemented as appropriate)

## 2017-01-12 NOTE — PROGRESS NOTES
LOS: 2 days     Name: Juliana Gardner  Age/Sex: 89 y.o. female  :  1918        PCP: Wanda Noel MD    Subjective   She is feeling a little better today but was having some significant leg pain overnight.  Overall improvement.  Denies any fevers or chills.  General: No Fever or Chills, Cardiac: No Chest Pain or Palpitations, Resp: No Cough or SOA, GI: No Nausea, Vomiting, or Diarrhea and Other: No bleeding      aspirin 81 mg Oral Daily   baclofen 5 mg Oral Nightly   ceFAZolin 1 g Intravenous Q12H   cilostazol 100 mg Oral BID   cycloSPORINE 1 drop Both Eyes Q12H   docusate sodium 250 mg Oral Nightly   enoxaparin 30 mg Subcutaneous Q24H   famotidine 20 mg Oral Daily   gabapentin 300 mg Oral BID   HYDROcodone-acetaminophen 0.5 tablet Oral Q6H   latanoprost 1 drop Both Eyes Nightly   losartan 50 mg Oral Daily   polyethylene glycol 17 g Oral BID   pramipexole 0.25 mg Oral Nightly   senna 2 tablet Oral Nightly   traMADol 50 mg Oral Nightly          Objective   Vital Signs  Temp:  [97.9 °F (36.6 °C)-98.2 °F (36.8 °C)] 97.9 °F (36.6 °C)  Heart Rate:  [74-79] 74  Resp:  [18-20] 18  BP: (124-133)/(61-73) 124/62  Body mass index is 20.97 kg/(m^2).    Intake/Output Summary (Last 24 hours) at 17 1526  Last data filed at 17   Gross per 24 hour   Intake      0 ml   Output      1 ml   Net     -1 ml       General:  Resting comfortably no active distress  Eyes:  PERRL; no conj pallor; EOMI; no scleral icterus  ENT:  oral mucosa moist; pharynx w/o exudate; ext inspect WNL  Neck: nl movement; no JVD; no adenopathy; thyroid WNL  Lungs:  CTA; good air entry; Non labored; No wheeze, No Crackle, No Rhonchi  Cardiac:  RRR 3/6murmur;  nl S1/S2  Abd:  soft; non tender; non distended; no HSM; no masses; BS +  : deferred  Musc/Skel: no arthropathy,bilateral lower extremities appear improved compared to Dr. Pratt's evaluation yesterday.  Bilateral ulcerations with  base some improvement  Skin:  warm and  perfused; no apparent rash on examined areas  Neuro: CN grossly intact; no focal deficit of strength or sensory   Ext/P Vasc:  peripheral pulses 2+; no clubbing ; no cyanosis; no edema  MS & Psychiatric: A&O x  3; memory intact; affect/mood appropriate    Results Review:       I reviewed the patient's new clinical results.    Results from last 7 days  Lab Units 01/12/17  0730 01/11/17  0717 01/10/17  1232   WBC 10*3/mm3 4.40* 4.93 6.34   HEMOGLOBIN g/dL 10.1* 9.4* 10.4*   PLATELETS 10*3/mm3 299 266 318       Results from last 7 days  Lab Units 01/12/17  0730 01/11/17  0717 01/10/17  1232   SODIUM mmol/L 141 139 140   POTASSIUM mmol/L 4.3 4.3 4.5   CHLORIDE mmol/L 105 105 102   TOTAL CO2 mmol/L 25.9 24.8 26.3   BUN mg/dL 17 17 16   CREATININE mg/dL 0.94 1.06* 1.16*   CALCIUM mg/dL 8.6 8.5 9.1   MAGNESIUM mg/dL 2.2 2.1 2.3   PHOSPHORUS mg/dL 3.4 3.4 3.6   Estimated Creatinine Clearance: 31.1 mL/min (by C-G formula based on Cr of 0.94).      Assessment/Plan   Active Problems:    Cellulitis of lower extremity    Venous stasis dermatitis of both lower extremities    Open leg wound    DNR (do not resuscitate)    Heart murmur    Cellulitis    Venous insufficiency of both lower extremities      PLAN  1. Bilateral lower extremity cellulitis: Continues to improve on IV Cefizolin if remains afebrile consider change to oral antibiotics, continue IV antibiotics for now while inpatient  2. Venous stasis dermatitis bilateral lower extremities:  Continue wound care  3. Venous stasis ulcerations:  Wound care is evaluating appreciated vascular's input.  Continue wraps manage edema  4. Heart murmur:Chronic issue no acute workup indicated  5. chronic kidney disease: Stable    Disposition  She'll probably be ready to go over the weekend      Ash Chase MD  [facility]  01/12/17  3:26 PM      EMR Dragon/Transcription disclaimer:     Much of this encounter note is an electronic transcription/translation of  spoken language to printed text. The electronic translation of spoken language may permit erroneous, or at times, nonsensical words or phrases to be inadvertently transcribed; Although I have reviewed the note for such errors, some may still exist.

## 2017-01-12 NOTE — PLAN OF CARE
Problem: Patient Care Overview (Adult)  Goal: Plan of Care Review    01/12/17 1651   Patient Care Overview   Progress progress towards functional goals is fair

## 2017-01-13 LAB
ANION GAP SERPL CALCULATED.3IONS-SCNC: 10.6 MMOL/L
BASOPHILS # BLD AUTO: 0.01 10*3/MM3 (ref 0–0.2)
BASOPHILS NFR BLD AUTO: 0.2 % (ref 0–1.5)
BUN BLD-MCNC: 16 MG/DL (ref 8–23)
BUN/CREAT SERPL: 16.8 (ref 7–25)
CALCIUM SPEC-SCNC: 8.7 MG/DL (ref 8.2–9.6)
CHLORIDE SERPL-SCNC: 104 MMOL/L (ref 98–107)
CO2 SERPL-SCNC: 27.4 MMOL/L (ref 22–29)
CREAT BLD-MCNC: 0.95 MG/DL (ref 0.57–1)
DEPRECATED RDW RBC AUTO: 49.7 FL (ref 37–54)
EOSINOPHIL # BLD AUTO: 0.25 10*3/MM3 (ref 0–0.7)
EOSINOPHIL NFR BLD AUTO: 5.4 % (ref 0.3–6.2)
ERYTHROCYTE [DISTWIDTH] IN BLOOD BY AUTOMATED COUNT: 14.1 % (ref 11.7–13)
GFR SERPL CREATININE-BSD FRML MDRD: 54 ML/MIN/1.73
GLUCOSE BLD-MCNC: 88 MG/DL (ref 65–99)
HCT VFR BLD AUTO: 33.2 % (ref 35.6–45.5)
HGB BLD-MCNC: 10.4 G/DL (ref 11.9–15.5)
IMM GRANULOCYTES # BLD: 0 10*3/MM3 (ref 0–0.03)
IMM GRANULOCYTES NFR BLD: 0 % (ref 0–0.5)
LYMPHOCYTES # BLD AUTO: 1.42 10*3/MM3 (ref 0.9–4.8)
LYMPHOCYTES NFR BLD AUTO: 30.7 % (ref 19.6–45.3)
MAGNESIUM SERPL-MCNC: 2.2 MG/DL (ref 1.7–2.3)
MCH RBC QN AUTO: 30.1 PG (ref 26.9–32)
MCHC RBC AUTO-ENTMCNC: 31.3 G/DL (ref 32.4–36.3)
MCV RBC AUTO: 96.2 FL (ref 80.5–98.2)
MONOCYTES # BLD AUTO: 0.71 10*3/MM3 (ref 0.2–1.2)
MONOCYTES NFR BLD AUTO: 15.3 % (ref 5–12)
NEUTROPHILS # BLD AUTO: 2.24 10*3/MM3 (ref 1.9–8.1)
NEUTROPHILS NFR BLD AUTO: 48.4 % (ref 42.7–76)
PHOSPHATE SERPL-MCNC: 3.3 MG/DL (ref 2.5–4.5)
PLATELET # BLD AUTO: 298 10*3/MM3 (ref 140–500)
PMV BLD AUTO: 9.3 FL (ref 6–12)
POTASSIUM BLD-SCNC: 4.4 MMOL/L (ref 3.5–5.2)
RBC # BLD AUTO: 3.45 10*6/MM3 (ref 3.9–5.2)
SODIUM BLD-SCNC: 142 MMOL/L (ref 136–145)
WBC NRBC COR # BLD: 4.63 10*3/MM3 (ref 4.5–10.7)

## 2017-01-13 PROCEDURE — 97110 THERAPEUTIC EXERCISES: CPT

## 2017-01-13 PROCEDURE — 25010000002 ENOXAPARIN PER 10 MG: Performed by: HOSPITALIST

## 2017-01-13 PROCEDURE — 83735 ASSAY OF MAGNESIUM: CPT | Performed by: INTERNAL MEDICINE

## 2017-01-13 PROCEDURE — 85025 COMPLETE CBC W/AUTO DIFF WBC: CPT | Performed by: INTERNAL MEDICINE

## 2017-01-13 PROCEDURE — 25010000003 CEFAZOLIN PER 500 MG: Performed by: INTERNAL MEDICINE

## 2017-01-13 PROCEDURE — 84100 ASSAY OF PHOSPHORUS: CPT | Performed by: INTERNAL MEDICINE

## 2017-01-13 PROCEDURE — 80048 BASIC METABOLIC PNL TOTAL CA: CPT | Performed by: INTERNAL MEDICINE

## 2017-01-13 RX ADMIN — CILOSTAZOL 100 MG: 100 TABLET ORAL at 17:38

## 2017-01-13 RX ADMIN — HYDROCODONE BITARTRATE AND ACETAMINOPHEN 0.5 TABLET: 5; 325 TABLET ORAL at 06:34

## 2017-01-13 RX ADMIN — ASPIRIN 81 MG: 81 TABLET, CHEWABLE ORAL at 09:04

## 2017-01-13 RX ADMIN — POLYETHYLENE GLYCOL 3350 17 G: 17 POWDER, FOR SOLUTION ORAL at 17:38

## 2017-01-13 RX ADMIN — CYCLOSPORINE 1 DROP: 0.5 EMULSION OPHTHALMIC at 20:59

## 2017-01-13 RX ADMIN — CILOSTAZOL 100 MG: 100 TABLET ORAL at 09:04

## 2017-01-13 RX ADMIN — SENNOSIDES 17.2 MG: 8.6 TABLET, FILM COATED ORAL at 20:59

## 2017-01-13 RX ADMIN — DOCUSATE SODIUM 250 MG: 250 CAPSULE, LIQUID FILLED ORAL at 20:59

## 2017-01-13 RX ADMIN — POLYETHYLENE GLYCOL 3350 17 G: 17 POWDER, FOR SOLUTION ORAL at 09:04

## 2017-01-13 RX ADMIN — GABAPENTIN 300 MG: 300 CAPSULE ORAL at 09:03

## 2017-01-13 RX ADMIN — LOSARTAN POTASSIUM 50 MG: 50 TABLET, FILM COATED ORAL at 09:03

## 2017-01-13 RX ADMIN — FAMOTIDINE 20 MG: 20 TABLET, FILM COATED ORAL at 09:03

## 2017-01-13 RX ADMIN — GABAPENTIN 300 MG: 300 CAPSULE ORAL at 17:38

## 2017-01-13 RX ADMIN — TRAMADOL HYDROCHLORIDE 50 MG: 50 TABLET, COATED ORAL at 20:59

## 2017-01-13 RX ADMIN — PRAMIPEXOLE DIHYDROCHLORIDE 0.25 MG: 0.25 TABLET ORAL at 20:59

## 2017-01-13 RX ADMIN — ENOXAPARIN SODIUM 30 MG: 30 INJECTION SUBCUTANEOUS at 09:04

## 2017-01-13 RX ADMIN — CYCLOSPORINE 1 DROP: 0.5 EMULSION OPHTHALMIC at 12:30

## 2017-01-13 RX ADMIN — LATANOPROST 1 DROP: 50 SOLUTION OPHTHALMIC at 20:59

## 2017-01-13 RX ADMIN — CEFAZOLIN SODIUM 1 G: 1 INJECTION, SOLUTION INTRAVENOUS at 20:59

## 2017-01-13 RX ADMIN — BACLOFEN 5 MG: 10 TABLET ORAL at 20:59

## 2017-01-13 RX ADMIN — CEFAZOLIN SODIUM 1 G: 1 INJECTION, SOLUTION INTRAVENOUS at 10:14

## 2017-01-13 RX ADMIN — HYDROCODONE BITARTRATE AND ACETAMINOPHEN 0.5 TABLET: 5; 325 TABLET ORAL at 19:16

## 2017-01-13 RX ADMIN — HYDROCODONE BITARTRATE AND ACETAMINOPHEN 0.5 TABLET: 5; 325 TABLET ORAL at 14:13

## 2017-01-13 NOTE — PROGRESS NOTES
LOS: 3 days   Patient Care Team:  Wanda Noel MD as PCP - General (Internal Medicine)    Chief Complaint: Lower extremity edema with stasis ulcerations and cellulitis    Subjective     89 y.o. female with stasis ulcerations and cellulitis.  Her legs hurt last with the wraps and the antibiotics.  She is getting every other day dressings with silver alginate.  The wounds yesterday showed significant improvement in edema and stable wounds that I believe are starting to improve.  Continue ace wraps twice a day.  She is aware that she needs to get up and walk when she is not walking she is to keep her legs elevated.    Review of Systems  Review of Systems - Negative except slightly disoriented this morning      Objective     Vital Signs  Temp:  [97.7 °F (36.5 °C)-98.3 °F (36.8 °C)] 98.2 °F (36.8 °C)  Heart Rate:  [] 78  Resp:  [20] 20  BP: (106-133)/(51-71) 122/57    Physical Exam  General: No acute distress. Alert and oriented x 4  HEENT: No jugular venous distension, trachea is midline  CV: RRR, S1S2  Resp: Clear unlabored breathing on both sides  Abd: Abdomen is soft, nontender, nondistended  Extremities: Edema improved with Ace wraps.  Wound still with a exudate.  Erythema of the shin without significant change.  Difficult to determine if this is cellulitis versus chronic skin injury.  We'll continue antibiotics and dressings.    Results Review:       Recent Results (from the past 24 hour(s))   Basic Metabolic Panel    Collection Time: 01/13/17  6:49 AM   Result Value Ref Range    Glucose 88 65 - 99 mg/dL    BUN 16 8 - 23 mg/dL    Creatinine 0.95 0.57 - 1.00 mg/dL    Sodium 142 136 - 145 mmol/L    Potassium 4.4 3.5 - 5.2 mmol/L    Chloride 104 98 - 107 mmol/L    CO2 27.4 22.0 - 29.0 mmol/L    Calcium 8.7 8.2 - 9.6 mg/dL    eGFR Non African Amer 54 (L) >60 mL/min/1.73    BUN/Creatinine Ratio 16.8 7.0 - 25.0    Anion Gap 10.6 mmol/L   Magnesium    Collection Time: 01/13/17  6:49 AM   Result Value Ref  Range    Magnesium 2.2 1.7 - 2.3 mg/dL   Phosphorus    Collection Time: 01/13/17  6:49 AM   Result Value Ref Range    Phosphorus 3.3 2.5 - 4.5 mg/dL   CBC Auto Differential    Collection Time: 01/13/17  6:49 AM   Result Value Ref Range    WBC 4.63 4.50 - 10.70 10*3/mm3    RBC 3.45 (L) 3.90 - 5.20 10*6/mm3    Hemoglobin 10.4 (L) 11.9 - 15.5 g/dL    Hematocrit 33.2 (L) 35.6 - 45.5 %    MCV 96.2 80.5 - 98.2 fL    MCH 30.1 26.9 - 32.0 pg    MCHC 31.3 (L) 32.4 - 36.3 g/dL    RDW 14.1 (H) 11.7 - 13.0 %    RDW-SD 49.7 37.0 - 54.0 fl    MPV 9.3 6.0 - 12.0 fL    Platelets 298 140 - 500 10*3/mm3    Neutrophil % 48.4 42.7 - 76.0 %    Lymphocyte % 30.7 19.6 - 45.3 %    Monocyte % 15.3 (H) 5.0 - 12.0 %    Eosinophil % 5.4 0.3 - 6.2 %    Basophil % 0.2 0.0 - 1.5 %    Immature Grans % 0.0 0.0 - 0.5 %    Neutrophils, Absolute 2.24 1.90 - 8.10 10*3/mm3    Lymphocytes, Absolute 1.42 0.90 - 4.80 10*3/mm3    Monocytes, Absolute 0.71 0.20 - 1.20 10*3/mm3    Eosinophils, Absolute 0.25 0.00 - 0.70 10*3/mm3    Basophils, Absolute 0.01 0.00 - 0.20 10*3/mm3    Immature Grans, Absolute 0.00 0.00 - 0.03 10*3/mm3   ]      Assessment/Plan           Active Problems:    Cellulitis of lower extremity    Venous stasis dermatitis of both lower extremities    Open leg wound    DNR (do not resuscitate)    Heart murmur    Cellulitis    Venous insufficiency of both lower extremities      Assessment & Plan  89 y.o. female improving with antibiotics and wound care.  Control the swelling will be paramount to her long-term ability to recover from these ulcers.  Dressings were changed today with significant improvement in edema.  Awaiting improvement and ulcers.  Skin injury is diffuse.  We will order physical therapy to make sure she is moving but when she is not under therapy she'll need to keep her legs elevated  we will change her dressing tomorrow and have it redressed by wound care.  After eventual discharge we will follow her up as an outpatient.   Thanks again for excellent care      Bo Albrecht MD  01/13/17  4:40 PM

## 2017-01-13 NOTE — PLAN OF CARE
Problem: Patient Care Overview (Adult)  Goal: Plan of Care Review  Outcome: Ongoing (interventions implemented as appropriate)    01/13/17 0855   Coping/Psychosocial Response Interventions   Plan Of Care Reviewed With patient   Patient Care Overview   Progress progress toward functional goals is gradual   Outcome Evaluation   Outcome Summary/Follow up Plan pt is A&O with some forgetfulness, wrap to bilateral legs continues, toe to knee, walker with 1 assist for ambulation, no complaint of SOA, pain medications are scheduled, slept well this shift       Goal: Adult Individualization and Mutuality  Outcome: Ongoing (interventions implemented as appropriate)    Problem: Fall Risk (Adult)  Goal: Absence of Falls  Outcome: Ongoing (interventions implemented as appropriate)    Problem: Skin Integrity Impairment, Risk/Actual (Adult)  Goal: Skin Integrity/Wound Healing  Outcome: Ongoing (interventions implemented as appropriate)    Problem: Pain, Acute (Adult)  Goal: Acceptable Pain Control/Comfort Level  Outcome: Ongoing (interventions implemented as appropriate)

## 2017-01-13 NOTE — PLAN OF CARE
Problem: Patient Care Overview (Adult)  Goal: Plan of Care Review    01/13/17 0832   Coping/Psychosocial Response Interventions   Plan Of Care Reviewed With patient   Patient Care Overview   Progress progress toward functional goals is gradual   Outcome Evaluation   Outcome Summary/Follow up Plan Pt tolerated increased gait distance with noted fatigue. Continues to demonstrate decreased toe to floor clearance.

## 2017-01-13 NOTE — PROGRESS NOTES
Acute Care - Physical Therapy Treatment Note  [facility]     Patient Name: Juliana Gardner  : 1918  MRN: [id number]  Today's Date: 2017             Admit Date: 1/10/2017    Visit Dx:    ICD-10-CM ICD-9-CM   1. Difficulty walking R26.2 719.7     Patient Active Problem List   Diagnosis   • Cellulitis of lower extremity   • Venous stasis dermatitis of both lower extremities   • Open leg wound   • DNR (do not resuscitate)   • Heart murmur   • Cellulitis   • Venous insufficiency of both lower extremities               Adult Rehabilitation Note       17 0830 17 1646       Rehab Assessment/Intervention    Discipline physical therapy assistant  -DM physical therapy assistant  -     Document Type therapy note (daily note)  -DM therapy note (daily note)  -     Subjective Information agree to therapy  -DM agree to therapy  -JW     Patient Effort, Rehab Treatment good  -DM      Symptoms Noted During/After Treatment fatigue  -DM increased pain  -JW     Precautions/Limitations fall precautions   skin integrity, BLE wounds  -DM fall precautions   skin integrity  -JW     Recorded by [DM] Vic Dunn PTA [JW] Katherin Soni PTA     Pain Assessment    Pain Assessment 0-10  -DM FLACC  -JW     Pain Score 3  -DM 5  -JW     Pain Location Leg  -DM Leg  -JW     Pain Orientation Right;Left  -DM Left  -JW     Pain Intervention(s) Repositioned  -DM Repositioned  -JW     Recorded by [DM] Vic Dunn PTA [JW] Katherin Soni PTA     Cognitive Assessment/Intervention    Personal Safety Interventions  fall prevention program maintained;gait belt  -JW     Recorded by  [JW] Katherin Soni PTA     Bed Mobility, Assessment/Treatment    Bed Mob, Supine to Sit, [place] contact guard assist  -DM      Bed Mob, Sit to Supine, [place] contact guard assist  -DM      Recorded by [DM] Vic Dunn PTA      Transfer Assessment/Treatment    Transfers, Sit-Stand [place] minimum assist (75%  patient effort)  -DM      Transfers, Stand-Sit [place] contact guard assist  -DM      Transfers, Sit-Stand-Sit, Assist Device rolling walker  -DM      Recorded by [DM] Vic Dunn PTA      Gait Assessment/Treatment    Gait, [place] Level contact guard assist  -DM minimum assist (75% patient effort);2 person assist required  -JW     Gait, Assistive Device rolling walker  -DM      Gait, Distance (Feet) 20  -DM 15  -JW     Gait, Gait Deviations leda decreased;forward flexed posture;limb motion velocity decreased;step length decreased;stride length decreased;toe-to-floor clearance decreased  -DM leda decreased;decreased heel strike;step length decreased;forward flexed posture  -JW     Gait, Comment fatigue limiting  -DM      Recorded by [DM] Vic Dunn PTA [JW] Katherin Soni PTA     Positioning and Restraints    Pre-Treatment Position in bed  -DM in bed  -JW     Post Treatment Position bed  -DM bed  -JW     In Bed supine;encouraged to call for assist;call light within reach;exit alarm on  -DM supine;call light within reach;encouraged to call for assist;exit alarm on  -JW     Recorded by [DM] Vic Dunn PTA [JW] Katherin Soni PTA       User Key  (r) = Recorded By, (t) = Taken By, (c) = Cosigned By    Initials Name Effective Dates     Vic Dunn PTA 05/18/15 -      Katherin Soni PTA 02/18/16 -                 IP PT Goals       01/11/17 1623          Bed Mobility PT LTG    Bed Mobility PT LTG, Date Established 01/11/17  -KH      Bed Mobility PT LTG, Time to Achieve 1 wk  -KH      Bed Mobility PT LTG, Activity Type all bed mobility  -KH      Bed Mobility PT LTG, [place] Level supervision required  -KH      Transfer Training PT LTG    Transfer Training PT LTG, Date Established 01/11/17  -KH      Transfer Training PT LTG, Time to Achieve 1 wk  -KH      Transfer Training PT LTG, Activity Type all transfers  -KH      Transfer Training PT LTG, [place]  Level supervision required  -KH      Transfer Training PT LTG, Assist Device walker, rolling  -KH      Gait Training PT LTG    Gait Training Goal PT LTG, Date Established 01/11/17  -KH      Gait Training Goal PT LTG, Time to Achieve 1 wk  -KH      Gait Training Goal PT LTG, [place] Level supervision required  -KH      Gait Training Goal PT LTG, Assist Device walker, rolling  -KH      Gait Training Goal PT LTG, Distance to Achieve 150 ft  -KH      Patient Education PT LTG    Patient Education PT LTG, Date Established 01/11/17  -KH      Patient Education PT LTG, Time to Achieve 1 wk  -KH      Patient Education PT LTG, Education Type HEP  -KH      Patient Education PT LTG, Education Understanding demonstrate adequately  -KH        User Key  (r) = Recorded By, (t) = Taken By, (c) = Cosigned By    Initials Name Provider Type    DIMA Bowman, PT Physical Therapist          Physical Therapy Education     Title: PT OT SLP Therapies (Done)     Topic: Physical Therapy (Done)     Point: Mobility training (Done)    Learning Progress Summary    Learner Readiness Method Response Comment Documented by Status   Patient Acceptance E,D VU  DM 01/13/17 0833 Done    Acceptance E VU  JW 01/12/17 1651 Done    Acceptance E VU  KH 01/11/17 1623 Done               Point: Home exercise program (Done)    Learning Progress Summary    Learner Readiness Method Response Comment Documented by Status   Patient Acceptance E,D VU  DM 01/13/17 0833 Done    Acceptance E VU  KH 01/11/17 1623 Done               Point: Body mechanics (Done)    Learning Progress Summary    Learner Readiness Method Response Comment Documented by Status   Patient Acceptance E,D VU  DM 01/13/17 0833 Done    Acceptance E VU  KH 01/11/17 1623 Done               Point: Precautions (Done)    Learning Progress Summary    Learner Readiness Method Response Comment Documented by Status   Patient Acceptance E,D VU  DM 01/13/17 0833 Done                      User Key      Initials Effective Dates Name Provider Type Discipline     05/18/15 -  Nae Bowman, PT Physical Therapist PT    ALEAH 05/18/15 -  Vic Dunn, TRAM Physical Therapy Assistant PT    HOLLI 02/18/16 -  Katherin Soni, TRAM Physical Therapy Assistant PT                    PT Recommendation and Plan  Anticipated Discharge Disposition: extended care facility  Planned Therapy Interventions: balance training, bed mobility training, gait training, home exercise program, strengthening, transfer training, patient/family education  PT Frequency: daily  Plan of Care Review  Plan Of Care Reviewed With: patient  Progress: progress toward functional goals is gradual  Outcome Summary/Follow up Plan: Pt tolerated increased gait distance with noted fatigue. Continues to demonstrate decreased toe to floor clearance.          Outcome Measures       01/13/17 0800 01/12/17 1600 01/11/17 1626    How much help from another person do you currently need...    Turning from your back to your side while in flat bed without using bedrails? 3  -DM 3  -JW 3  -KH    Moving from lying on back to sitting on the side of a flat bed without bedrails? 3  -DM 3  -JW 3  -KH    Moving to and from a bed to a chair (including a wheelchair)? 3  -DM 3  -JW 3  -KH    Standing up from a chair using your arms (e.g., wheelchair, bedside chair)? 3  -DM 3  -JW 3  -KH    Climbing 3-5 steps with a railing? 2  -DM 2  -JW 2  -KH    To walk in hospital room? 3  -DM 3  -JW 3  -KH    AM-PAC 6 Clicks Score 17  -DM 17  -JW 17  -KH    Functional Assessment    Outcome Measure Options AM-PAC 6 Clicks Basic Mobility (PT)  -DM  AM-PAC 6 Clicks Basic Mobility (PT)  -KH      User Key  (r) = Recorded By, (t) = Taken By, (c) = Cosigned By    Initials Name Provider Type    DIMA Bowman, PT Physical Therapist    ALEAH Dunn, TRAM Physical Therapy Assistant    HOLLI Soni PTA Physical Therapy Assistant           Time Calculation:         PT  Charges       01/13/17 0833          Time Calculation    Start Time 0817  -DM      Stop Time 0833  -DM      Time Calculation (min) 16 min  -DM      PT Received On 01/13/17  -DM      PT - Next Appointment 01/14/17  -DM        User Key  (r) = Recorded By, (t) = Taken By, (c) = Cosigned By    Initials Name Provider Type    DM Vic Dunn PTA Physical Therapy Assistant          Therapy Charges for Today     Code Description Service Date Service Provider Modifiers Qty    [id number] HC PT THER PROC EA 15 MIN 1/13/2017 Vic Dunn PTA GP 1          PT G-Codes  Outcome Measure Options: AM-PAC 6 Clicks Basic Mobility (PT)    Vic Dunn PTA  1/13/2017

## 2017-01-13 NOTE — PROGRESS NOTES
LOS: 3 days     Name: Juliana Gardner  Age/Sex: 89 y.o. female  :  1918        PCP: Wanda Noel MD    Subjective   Rested well denies pain  Overall improvement.  Denies any fevers or chills.  General: No Fever or Chills, Cardiac: No Chest Pain or Palpitations, Resp: No Cough or SOA, GI: No Nausea, Vomiting, or Diarrhea and Other: No bleeding      aspirin 81 mg Oral Daily   baclofen 5 mg Oral Nightly   ceFAZolin 1 g Intravenous Q12H   cilostazol 100 mg Oral BID   cycloSPORINE 1 drop Both Eyes Q12H   docusate sodium 250 mg Oral Nightly   enoxaparin 30 mg Subcutaneous Q24H   famotidine 20 mg Oral Daily   gabapentin 300 mg Oral BID   HYDROcodone-acetaminophen 0.5 tablet Oral Q6H   latanoprost 1 drop Both Eyes Nightly   losartan 50 mg Oral Daily   polyethylene glycol 17 g Oral BID   pramipexole 0.25 mg Oral Nightly   senna 2 tablet Oral Nightly   traMADol 50 mg Oral Nightly          Objective   Vital Signs  Temp:  [97.9 °F (36.6 °C)-98.3 °F (36.8 °C)] 98.3 °F (36.8 °C)  Heart Rate:  [] 76  Resp:  [18-20] 20  BP: (106-124)/(51-65) 121/51  Body mass index is 20.97 kg/(m^2).    Intake/Output Summary (Last 24 hours) at 17 1334  Last data filed at 17 0500   Gross per 24 hour   Intake     30 ml   Output      0 ml   Net     30 ml       General:  Resting comfortably no active distress  Eyes:  PERRL; no conj pallor; EOMI; no scleral icterus  ENT:  oral mucosa moist; pharynx w/o exudate; ext inspect WNL  Neck: nl movement; no JVD; no adenopathy; thyroid WNL  Lungs:  CTA; good air entry; Non labored; No wheeze, No Crackle, No Rhonchi  Cardiac:  RRR 3/6murmur;  nl S1/S2  Abd:  soft; non tender; non distended; no HSM; no masses; BS +  : deferred  Musc/Skel: no arthropathy,bilateral lower extremities appear improved compared to Dr. Pratt's evaluation yesterday.  Bilateral ulcerations with  base some improvement  Skin:  warm and perfused; no apparent rash on examined areas  Neuro: CN grossly  intact; no focal deficit of strength or sensory   Ext/P Vasc:  peripheral pulses 2+; no clubbing ; no cyanosis; no edema  MS & Psychiatric: A&O x  3; memory intact; affect/mood appropriate    Results Review:       I reviewed the patient's new clinical results.    Results from last 7 days  Lab Units 01/13/17  0649 01/12/17  0730 01/11/17  0717 01/10/17  1232   WBC 10*3/mm3 4.63 4.40* 4.93 6.34   HEMOGLOBIN g/dL 10.4* 10.1* 9.4* 10.4*   PLATELETS 10*3/mm3 298 299 266 318       Results from last 7 days  Lab Units 01/13/17  0649 01/12/17  0730 01/11/17  0717 01/10/17  1232   SODIUM mmol/L 142 141 139 140   POTASSIUM mmol/L 4.4 4.3 4.3 4.5   CHLORIDE mmol/L 104 105 105 102   TOTAL CO2 mmol/L 27.4 25.9 24.8 26.3   BUN mg/dL 16 17 17 16   CREATININE mg/dL 0.95 0.94 1.06* 1.16*   CALCIUM mg/dL 8.7 8.6 8.5 9.1   MAGNESIUM mg/dL 2.2 2.2 2.1 2.3   PHOSPHORUS mg/dL 3.3 3.4 3.4 3.6   Estimated Creatinine Clearance: 30.7 mL/min (by C-G formula based on Cr of 0.95).      Assessment/Plan   Active Problems:    Cellulitis of lower extremity    Venous stasis dermatitis of both lower extremities    Open leg wound    DNR (do not resuscitate)    Heart murmur    Cellulitis    Venous insufficiency of both lower extremities      PLAN  1. Bilateral lower extremity cellulitis: Continues to improve on IV Cefizolin if remains afebrile consider change to oral antibiotics, continue IV antibiotics for now while inpatient, change to oral Keflex in the morning  2. Venous stasis dermatitis bilateral lower extremities:  Continue wound care  3. Venous stasis ulcerations:  Wound care is evaluating appreciated vascular's input.  Continue wraps manage edema  4. Heart murmur:Chronic issue no acute workup indicated  5. chronic kidney disease: Stable    Disposition  Plan to rehabilitation over the weekend when okay with .        Ash Chase MD  [facility]  01/13/17  1:34 PM      EMR Dragon/Transcription disclaimer:      Much of this encounter note is an electronic transcription/translation of spoken language to printed text. The electronic translation of spoken language may permit erroneous, or at times, nonsensical words or phrases to be inadvertently transcribed; Although I have reviewed the note for such errors, some may still exist.

## 2017-01-14 LAB
ANION GAP SERPL CALCULATED.3IONS-SCNC: 11.5 MMOL/L
BASOPHILS # BLD AUTO: 0.03 10*3/MM3 (ref 0–0.2)
BASOPHILS NFR BLD AUTO: 0.7 % (ref 0–1.5)
BUN BLD-MCNC: 16 MG/DL (ref 8–23)
BUN/CREAT SERPL: 18.2 (ref 7–25)
CALCIUM SPEC-SCNC: 8.6 MG/DL (ref 8.2–9.6)
CHLORIDE SERPL-SCNC: 103 MMOL/L (ref 98–107)
CO2 SERPL-SCNC: 25.5 MMOL/L (ref 22–29)
CREAT BLD-MCNC: 0.88 MG/DL (ref 0.57–1)
DEPRECATED RDW RBC AUTO: 52.9 FL (ref 37–54)
EOSINOPHIL # BLD AUTO: 0.33 10*3/MM3 (ref 0–0.7)
EOSINOPHIL NFR BLD AUTO: 7.3 % (ref 0.3–6.2)
ERYTHROCYTE [DISTWIDTH] IN BLOOD BY AUTOMATED COUNT: 14.1 % (ref 11.7–13)
GFR SERPL CREATININE-BSD FRML MDRD: 59 ML/MIN/1.73
GLUCOSE BLD-MCNC: 88 MG/DL (ref 65–99)
HCT VFR BLD AUTO: 33.4 % (ref 35.6–45.5)
HGB BLD-MCNC: 9.8 G/DL (ref 11.9–15.5)
IMM GRANULOCYTES # BLD: 0 10*3/MM3 (ref 0–0.03)
IMM GRANULOCYTES NFR BLD: 0 % (ref 0–0.5)
LYMPHOCYTES # BLD AUTO: 1.47 10*3/MM3 (ref 0.9–4.8)
LYMPHOCYTES NFR BLD AUTO: 32.4 % (ref 19.6–45.3)
MAGNESIUM SERPL-MCNC: 2.2 MG/DL (ref 1.7–2.3)
MCH RBC QN AUTO: 29.7 PG (ref 26.9–32)
MCHC RBC AUTO-ENTMCNC: 29.3 G/DL (ref 32.4–36.3)
MCV RBC AUTO: 101.2 FL (ref 80.5–98.2)
MONOCYTES # BLD AUTO: 0.67 10*3/MM3 (ref 0.2–1.2)
MONOCYTES NFR BLD AUTO: 14.8 % (ref 5–12)
NEUTROPHILS # BLD AUTO: 2.04 10*3/MM3 (ref 1.9–8.1)
NEUTROPHILS NFR BLD AUTO: 44.8 % (ref 42.7–76)
PHOSPHATE SERPL-MCNC: 3.3 MG/DL (ref 2.5–4.5)
PLATELET # BLD AUTO: 290 10*3/MM3 (ref 140–500)
PMV BLD AUTO: 9.1 FL (ref 6–12)
POTASSIUM BLD-SCNC: 4.1 MMOL/L (ref 3.5–5.2)
RBC # BLD AUTO: 3.3 10*6/MM3 (ref 3.9–5.2)
SODIUM BLD-SCNC: 140 MMOL/L (ref 136–145)
WBC NRBC COR # BLD: 4.54 10*3/MM3 (ref 4.5–10.7)

## 2017-01-14 PROCEDURE — 85025 COMPLETE CBC W/AUTO DIFF WBC: CPT | Performed by: INTERNAL MEDICINE

## 2017-01-14 PROCEDURE — 25010000002 ENOXAPARIN PER 10 MG: Performed by: HOSPITALIST

## 2017-01-14 PROCEDURE — 25010000003 CEFAZOLIN PER 500 MG: Performed by: INTERNAL MEDICINE

## 2017-01-14 PROCEDURE — 84100 ASSAY OF PHOSPHORUS: CPT | Performed by: INTERNAL MEDICINE

## 2017-01-14 PROCEDURE — 80048 BASIC METABOLIC PNL TOTAL CA: CPT | Performed by: INTERNAL MEDICINE

## 2017-01-14 PROCEDURE — 83735 ASSAY OF MAGNESIUM: CPT | Performed by: INTERNAL MEDICINE

## 2017-01-14 PROCEDURE — 97110 THERAPEUTIC EXERCISES: CPT

## 2017-01-14 RX ADMIN — FAMOTIDINE 20 MG: 20 TABLET, FILM COATED ORAL at 09:55

## 2017-01-14 RX ADMIN — LOSARTAN POTASSIUM 50 MG: 50 TABLET, FILM COATED ORAL at 09:55

## 2017-01-14 RX ADMIN — HYDROCODONE BITARTRATE AND ACETAMINOPHEN 0.5 TABLET: 5; 325 TABLET ORAL at 06:25

## 2017-01-14 RX ADMIN — CYCLOSPORINE 1 DROP: 0.5 EMULSION OPHTHALMIC at 21:23

## 2017-01-14 RX ADMIN — DOCUSATE SODIUM 250 MG: 250 CAPSULE, LIQUID FILLED ORAL at 20:18

## 2017-01-14 RX ADMIN — PRAMIPEXOLE DIHYDROCHLORIDE 0.25 MG: 0.25 TABLET ORAL at 20:18

## 2017-01-14 RX ADMIN — CILOSTAZOL 100 MG: 100 TABLET ORAL at 18:15

## 2017-01-14 RX ADMIN — CILOSTAZOL 100 MG: 100 TABLET ORAL at 09:55

## 2017-01-14 RX ADMIN — HYDROCODONE BITARTRATE AND ACETAMINOPHEN 0.5 TABLET: 5; 325 TABLET ORAL at 18:15

## 2017-01-14 RX ADMIN — BACLOFEN 5 MG: 10 TABLET ORAL at 20:18

## 2017-01-14 RX ADMIN — CEFAZOLIN SODIUM 1 G: 1 INJECTION, SOLUTION INTRAVENOUS at 09:56

## 2017-01-14 RX ADMIN — GABAPENTIN 300 MG: 300 CAPSULE ORAL at 09:55

## 2017-01-14 RX ADMIN — POLYETHYLENE GLYCOL 3350 17 G: 17 POWDER, FOR SOLUTION ORAL at 09:56

## 2017-01-14 RX ADMIN — TRAMADOL HYDROCHLORIDE 50 MG: 50 TABLET, COATED ORAL at 21:41

## 2017-01-14 RX ADMIN — ASPIRIN 81 MG: 81 TABLET, CHEWABLE ORAL at 09:55

## 2017-01-14 RX ADMIN — HYDROCODONE BITARTRATE AND ACETAMINOPHEN 0.5 TABLET: 5; 325 TABLET ORAL at 15:37

## 2017-01-14 RX ADMIN — GABAPENTIN 300 MG: 300 CAPSULE ORAL at 18:15

## 2017-01-14 RX ADMIN — POLYETHYLENE GLYCOL 3350 17 G: 17 POWDER, FOR SOLUTION ORAL at 18:15

## 2017-01-14 RX ADMIN — LATANOPROST 1 DROP: 50 SOLUTION OPHTHALMIC at 20:18

## 2017-01-14 RX ADMIN — SENNOSIDES 17.2 MG: 8.6 TABLET, FILM COATED ORAL at 20:18

## 2017-01-14 RX ADMIN — ENOXAPARIN SODIUM 30 MG: 30 INJECTION SUBCUTANEOUS at 09:55

## 2017-01-14 RX ADMIN — CEFAZOLIN SODIUM 1 G: 1 INJECTION, SOLUTION INTRAVENOUS at 21:23

## 2017-01-14 RX ADMIN — HYDROCODONE BITARTRATE AND ACETAMINOPHEN 0.5 TABLET: 5; 325 TABLET ORAL at 00:20

## 2017-01-14 NOTE — PROGRESS NOTES
LOS: 4 days     Name: Juliana Gardner  Age/Sex: 89 y.o. female  :  1918        PCP: Wanda Noel MD    Subjective   He continues to improve daily  General: No Fever or Chills, Cardiac: No Chest Pain or Palpitations, Resp: No Cough or SOA, GI: No Nausea, Vomiting, or Diarrhea and Other: No bleeding      aspirin 81 mg Oral Daily   baclofen 5 mg Oral Nightly   ceFAZolin 1 g Intravenous Q12H   cilostazol 100 mg Oral BID   cycloSPORINE 1 drop Both Eyes Q12H   docusate sodium 250 mg Oral Nightly   enoxaparin 30 mg Subcutaneous Q24H   famotidine 20 mg Oral Daily   gabapentin 300 mg Oral BID   HYDROcodone-acetaminophen 0.5 tablet Oral Q6H   latanoprost 1 drop Both Eyes Nightly   losartan 50 mg Oral Daily   polyethylene glycol 17 g Oral BID   pramipexole 0.25 mg Oral Nightly   senna 2 tablet Oral Nightly   traMADol 50 mg Oral Nightly          Objective   Vital Signs  Temp:  [98 °F (36.7 °C)-98.2 °F (36.8 °C)] 98 °F (36.7 °C)  Heart Rate:  [61-83] 74  Resp:  [16-20] 16  BP: (112-123)/(48-57) 112/48  Body mass index is 20.97 kg/(m^2).  No intake or output data in the 24 hours ending 17 1147    General:  Resting comfortably no active distress  Eyes:  PERRL; no conj pallor; EOMI; no scleral icterus  ENT:  oral mucosa moist; pharynx w/o exudate; ext inspect WNL  Neck: nl movement; no JVD; no adenopathy; thyroid WNL  Lungs:  CTA; good air entry; Non labored; No wheeze, No Crackle, No Rhonchi  Cardiac:  RRR 3/6murmur;  nl S1/S2  Abd:  soft; non tender; non distended; no HSM; no masses; BS +  : deferred  Musc/Skel: no arthropathy,bilateral lower extremities appear improved evaluated wounds bilaterally right sided has greatly healed left side shows some improvement edema greatly resolved.    Skin:  warm and perfused; no apparent rash on examined areas  Neuro: CN grossly intact; no focal deficit of strength or sensory   Ext/P Vasc:  peripheral pulses 2+; no clubbing ; no cyanosis; no edema  MS & Psychiatric: A&O  x  3; memory intact; affect/mood appropriate    Results Review:       I reviewed the patient's new clinical results.    Results from last 7 days  Lab Units 01/14/17  0722 01/13/17  0649 01/12/17  0730 01/11/17  0717 01/10/17  1232   WBC 10*3/mm3 4.54 4.63 4.40* 4.93 6.34   HEMOGLOBIN g/dL 9.8* 10.4* 10.1* 9.4* 10.4*   PLATELETS 10*3/mm3 290 298 299 266 318       Results from last 7 days  Lab Units 01/14/17  0722 01/13/17  0649 01/12/17  0730 01/11/17  0717 01/10/17  1232   SODIUM mmol/L 140 142 141 139 140   POTASSIUM mmol/L 4.1 4.4 4.3 4.3 4.5   CHLORIDE mmol/L 103 104 105 105 102   TOTAL CO2 mmol/L 25.5 27.4 25.9 24.8 26.3   BUN mg/dL 16 16 17 17 16   CREATININE mg/dL 0.88 0.95 0.94 1.06* 1.16*   CALCIUM mg/dL 8.6 8.7 8.6 8.5 9.1   MAGNESIUM mg/dL 2.2 2.2 2.2 2.1 2.3   PHOSPHORUS mg/dL 3.3 3.3 3.4 3.4 3.6   Estimated Creatinine Clearance: 33.2 mL/min (by C-G formula based on Cr of 0.88).      Assessment/Plan   Active Problems:    Cellulitis of lower extremity    Venous stasis dermatitis of both lower extremities    Open leg wound    DNR (do not resuscitate)    Heart murmur    Cellulitis    Venous insufficiency of both lower extremities      PLAN  1. Bilateral lower extremity cellulitis: Continues to improve on IV Cefizolin if remains afebrile consider change to oral antibiotics, continue IV antibiotics for now while inpatient  2. Venous stasis dermatitis bilateral lower extremities:  Continue wound care  3. Venous stasis ulcerations:  Wound care is evaluating appreciated vascular's input.  Continue wraps manage edema  4. Heart murmur:Chronic issue no acute workup indicated  5. chronic kidney disease: Stable    Disposition  Dr. Albrecht will evaluate again on Monday maybe ready for discharge early next week      Ash Chase MD  [facility]  01/14/17  11:47 AM      EMR Dragon/Transcription disclaimer:     Much of this encounter note is an electronic transcription/translation of spoken  language to printed text. The electronic translation of spoken language may permit erroneous, or at times, nonsensical words or phrases to be inadvertently transcribed; Although I have reviewed the note for such errors, some may still exist.

## 2017-01-14 NOTE — PROGRESS NOTES
Doyle Keating MD       LOS: 4 days   Patient Care Team:  Wanda Noel MD as PCP - General (Internal Medicine)    Subjective     89 y.o. female venous shortness of breath overnight.  Patient tolerating compression without issue.    Review of Systems    Objective     Vital Signs  Temp:  [98 °F (36.7 °C)-98.2 °F (36.8 °C)] 98 °F (36.7 °C)  Heart Rate:  [61-83] 74  Resp:  [16-20] 16  BP: (112-123)/(48-57) 112/48    Physical Exam:  Physical Exam   Constitutional: She is oriented to person, place, and time. She appears well-developed and well-nourished.   Eyes: Pupils are equal, round, and reactive to light. No scleral icterus.   Neck: Normal range of motion. No JVD present.   Pulmonary/Chest: Effort normal. No respiratory distress.   Abdominal: Soft. She exhibits no distension.   Musculoskeletal: She exhibits edema.   Neurological: She is alert and oriented to person, place, and time.   Skin: There is erythema. No pallor.   Psychiatric: She has a normal mood and affect. Her behavior is normal.       Results Review:       Recent Results (from the past 12 hour(s))   Basic Metabolic Panel    Collection Time: 01/14/17  7:22 AM   Result Value Ref Range    Glucose 88 65 - 99 mg/dL    BUN 16 8 - 23 mg/dL    Creatinine 0.88 0.57 - 1.00 mg/dL    Sodium 140 136 - 145 mmol/L    Potassium 4.1 3.5 - 5.2 mmol/L    Chloride 103 98 - 107 mmol/L    CO2 25.5 22.0 - 29.0 mmol/L    Calcium 8.6 8.2 - 9.6 mg/dL    eGFR Non African Amer 59 (L) >60 mL/min/1.73    BUN/Creatinine Ratio 18.2 7.0 - 25.0    Anion Gap 11.5 mmol/L   Magnesium    Collection Time: 01/14/17  7:22 AM   Result Value Ref Range    Magnesium 2.2 1.7 - 2.3 mg/dL   Phosphorus    Collection Time: 01/14/17  7:22 AM   Result Value Ref Range    Phosphorus 3.3 2.5 - 4.5 mg/dL   CBC Auto Differential    Collection Time: 01/14/17  7:22 AM   Result Value Ref Range    WBC 4.54 4.50 - 10.70 10*3/mm3    RBC 3.30 (L) 3.90 - 5.20 10*6/mm3    Hemoglobin 9.8 (L) 11.9 - 15.5 g/dL     Hematocrit 33.4 (L) 35.6 - 45.5 %    .2 (H) 80.5 - 98.2 fL    MCH 29.7 26.9 - 32.0 pg    MCHC 29.3 (L) 32.4 - 36.3 g/dL    RDW 14.1 (H) 11.7 - 13.0 %    RDW-SD 52.9 37.0 - 54.0 fl    MPV 9.1 6.0 - 12.0 fL    Platelets 290 140 - 500 10*3/mm3    Neutrophil % 44.8 42.7 - 76.0 %    Lymphocyte % 32.4 19.6 - 45.3 %    Monocyte % 14.8 (H) 5.0 - 12.0 %    Eosinophil % 7.3 (H) 0.3 - 6.2 %    Basophil % 0.7 0.0 - 1.5 %    Immature Grans % 0.0 0.0 - 0.5 %    Neutrophils, Absolute 2.04 1.90 - 8.10 10*3/mm3    Lymphocytes, Absolute 1.47 0.90 - 4.80 10*3/mm3    Monocytes, Absolute 0.67 0.20 - 1.20 10*3/mm3    Eosinophils, Absolute 0.33 0.00 - 0.70 10*3/mm3    Basophils, Absolute 0.03 0.00 - 0.20 10*3/mm3    Immature Grans, Absolute 0.00 0.00 - 0.03 10*3/mm3   ]      Assessment/Plan     Active Problems:    Cellulitis of lower extremity    Venous stasis dermatitis of both lower extremities    Open leg wound    DNR (do not resuscitate)    Heart murmur    Cellulitis    Venous insufficiency of both lower extremities         89 y.o. female venous stasis ulcers with superimposed cellulitis.  Patient tolerating compression well.  Continue with current dressing changes.  Dr. Dove to see patient again on Monday.  She may need Unna boots or Profore dressings as an outpatient.  Please see above photo for documentation of current status of legs.  Please call with any further questions.    Doyle Keating MD  01/14/17  10:17 AM    Please call my office with any question: [phone]

## 2017-01-14 NOTE — PLAN OF CARE
Problem: Patient Care Overview (Adult)  Goal: Plan of Care Review  Outcome: Ongoing (interventions implemented as appropriate)    01/14/17 0647   Coping/Psychosocial Response Interventions   Plan Of Care Reviewed With patient   Patient Care Overview   Progress improving   Outcome Evaluation   Outcome Summary/Follow up Plan Pt rested well most of the night. No c/o pain or SOA. VSS, no s/s acute distress noted       Goal: Adult Individualization and Mutuality  Outcome: Ongoing (interventions implemented as appropriate)    Problem: Fall Risk (Adult)  Goal: Absence of Falls  Outcome: Ongoing (interventions implemented as appropriate)    Problem: Skin Integrity Impairment, Risk/Actual (Adult)  Goal: Skin Integrity/Wound Healing  Outcome: Ongoing (interventions implemented as appropriate)    Problem: Pain, Acute (Adult)  Goal: Acceptable Pain Control/Comfort Level  Outcome: Ongoing (interventions implemented as appropriate)

## 2017-01-14 NOTE — PLAN OF CARE
Problem: Patient Care Overview (Adult)  Goal: Plan of Care Review  Outcome: Ongoing (interventions implemented as appropriate)    01/14/17 1617   Coping/Psychosocial Response Interventions   Plan Of Care Reviewed With patient   Patient Care Overview   Progress improving   Outcome Evaluation   Outcome Summary/Follow up Plan pt denies any current complaints. dressings changed on legs. pain minimal. up with PT today. alert and oriented. free from falls.        Goal: Adult Individualization and Mutuality  Outcome: Ongoing (interventions implemented as appropriate)  Goal: Discharge Needs Assessment  Outcome: Ongoing (interventions implemented as appropriate)    Problem: Fall Risk (Adult)  Goal: Absence of Falls  Outcome: Ongoing (interventions implemented as appropriate)    01/14/17 1617   Fall Risk (Adult)   Absence of Falls making progress toward outcome         Problem: Skin Integrity Impairment, Risk/Actual (Adult)  Goal: Skin Integrity/Wound Healing  Outcome: Ongoing (interventions implemented as appropriate)    01/14/17 1617   Skin Integrity Impairment, Risk/Actual (Adult)   Skin Integrity/Wound Healing making progress toward outcome         Problem: Pain, Acute (Adult)  Goal: Acceptable Pain Control/Comfort Level  Outcome: Ongoing (interventions implemented as appropriate)    01/14/17 1617   Pain, Acute (Adult)   Acceptable Pain Control/Comfort Level making progress toward outcome

## 2017-01-14 NOTE — PROGRESS NOTES
Acute Care - Physical Therapy Treatment Note  [facility]     Patient Name: Juliana Gardner  : 1918  MRN: [id number]  Today's Date: 2017             Admit Date: 1/10/2017    Visit Dx:    ICD-10-CM ICD-9-CM   1. Difficulty walking R26.2 719.7     Patient Active Problem List   Diagnosis   • Cellulitis of lower extremity   • Venous stasis dermatitis of both lower extremities   • Open leg wound   • DNR (do not resuscitate)   • Heart murmur   • Cellulitis   • Venous insufficiency of both lower extremities               Adult Rehabilitation Note       17 1150 17 0830 17 1646    Rehab Assessment/Intervention    Discipline physical therapy assistant  -MM physical therapy assistant  -DM physical therapy assistant  -JW    Document Type therapy note (daily note)  -MM therapy note (daily note)  -DM therapy note (daily note)  -JW    Subjective Information agree to therapy  -MM agree to therapy  -DM agree to therapy  -JW    Patient Effort, Rehab Treatment good  -MM good  -DM     Symptoms Noted During/After Treatment  fatigue  -DM increased pain  -JW    Precautions/Limitations fall precautions  -MM fall precautions   skin integrity, BLE wounds  -DM fall precautions   skin integrity  -JW    Recorded by [MM] Hilaria Guerrero PTA [DM] Vic Dunn PTA [JW] Katherin Soni PTA    Pain Assessment    Pain Assessment No/denies pain  -MM 0-10  -DM FLACC  -JW    Pain Score  3  -DM 5  -JW    Pain Location  Leg  -DM Leg  -JW    Pain Orientation  Right;Left  -DM Left  -JW    Pain Intervention(s)  Repositioned  -DM Repositioned  -JW    Recorded by [MM] Hilaria Guerrero PTA [DM] Vic Dunn PTA [JW] Katherin Soni PTA    Cognitive Assessment/Intervention    Personal Safety Interventions   fall prevention program maintained;gait belt  -JW    Recorded by   [JW] Katherin Soni PTA    Bed Mobility, Assessment/Treatment    Bed Mobility, Assistive Device bed rails;head of bed elevated  -MM      Bed  Mob, Supine to Sit, [place] supervision required  -MM contact guard assist  -DM     Bed Mob, Sit to Supine, [place] not tested   pt sat up in chair  -MM contact guard assist  -DM     Recorded by [MM] Hilaria Guerrero PTA [DM] Vic Dunn PTA     Transfer Assessment/Treatment    Transfers, Sit-Stand [place] verbal cues required;contact guard assist;1 person + 1 person to manage equipment  -MM minimum assist (75% patient effort)  -DM     Transfers, Stand-Sit [place] verbal cues required;contact guard assist;1 person + 1 person to manage equipment  -MM contact guard assist  -DM     Transfers, Sit-Stand-Sit, Assist Device rolling walker  -MM rolling walker  -DM     Recorded by [MM] Hilaria Guerrero PTA [DM] Vic Dunn PTA     Gait Assessment/Treatment    Gait, [place] Level verbal cues required;contact guard assist  -MM contact guard assist  -DM minimum assist (75% patient effort);2 person assist required  -    Gait, Assistive Device rolling walker  -MM rolling walker  -DM     Gait, Distance (Feet) 30  -MM 20  -DM 15  -JW    Gait, Gait Deviations leda decreased;forward flexed posture;step length decreased  -MM leda decreased;forward flexed posture;limb motion velocity decreased;step length decreased;stride length decreased;toe-to-floor clearance decreased  -DM leda decreased;decreased heel strike;step length decreased;forward flexed posture  -JW    Gait, Comment  fatigue limiting  -DM     Recorded by [MM] Hilaria Guerrero PTA [DM] Vic Dunn PTA [JW] Katherin Soni PTA    Positioning and Restraints    Pre-Treatment Position in bed  -MM in bed  -DM in bed  -JW    Post Treatment Position chair  -MM bed  -DM bed  -JW    In Bed  supine;encouraged to call for assist;call light within reach;exit alarm on  -DM supine;call light within reach;encouraged to call for assist;exit alarm on  -JW    In Chair sitting;call light within reach;encouraged to call for  assist;exit alarm on;RLE elevated;LLE elevated  -MM      Recorded by [MM] Hilaria Guerrero, PTA [DM] Vic Dunn, PTA [JW] Katherin Soni PTA      User Key  (r) = Recorded By, (t) = Taken By, (c) = Cosigned By    Initials Name Effective Dates    DM Vic Dunn, PTA 05/18/15 -     JW Katherin Soni, PTA 02/18/16 -     MM Hilaria Guerrero, PTA 02/18/16 -                 IP PT Goals       01/11/17 1623          Bed Mobility PT LTG    Bed Mobility PT LTG, Date Established 01/11/17  -KH      Bed Mobility PT LTG, Time to Achieve 1 wk  -KH      Bed Mobility PT LTG, Activity Type all bed mobility  -KH      Bed Mobility PT LTG, [place] Level supervision required  -KH      Transfer Training PT LTG    Transfer Training PT LTG, Date Established 01/11/17  -KH      Transfer Training PT LTG, Time to Achieve 1 wk  -KH      Transfer Training PT LTG, Activity Type all transfers  -KH      Transfer Training PT LTG, [place] Level supervision required  -KH      Transfer Training PT LTG, Assist Device walker, rolling  -KH      Gait Training PT LTG    Gait Training Goal PT LTG, Date Established 01/11/17  -KH      Gait Training Goal PT LTG, Time to Achieve 1 wk  -KH      Gait Training Goal PT LTG, [place] Level supervision required  -KH      Gait Training Goal PT LTG, Assist Device walker, rolling  -KH      Gait Training Goal PT LTG, Distance to Achieve 150 ft  -KH      Patient Education PT LTG    Patient Education PT LTG, Date Established 01/11/17  -      Patient Education PT LTG, Time to Achieve 1 wk  -KH      Patient Education PT LTG, Education Type HEP  -KH      Patient Education PT LTG, Education Understanding demonstrate adequately  -KH        User Key  (r) = Recorded By, (t) = Taken By, (c) = Cosigned By    Initials Name Provider Type    DIMA Bowman, PT Physical Therapist          Physical Therapy Education     Title: PT OT SLP Therapies (Done)     Topic: Physical Therapy (Done)      Point: Mobility training (Done)    Learning Progress Summary    Learner Readiness Method Response Comment Documented by Status   Patient Acceptance E DU  MM 01/14/17 1152 Done    Acceptance E,D VU  DM 01/13/17 0833 Done    Acceptance E VU  JW 01/12/17 1651 Done    Acceptance E VU   01/11/17 1623 Done               Point: Home exercise program (Done)    Learning Progress Summary    Learner Readiness Method Response Comment Documented by Status   Patient Acceptance E,D VU  DM 01/13/17 0833 Done    Acceptance E VU   01/11/17 1623 Done               Point: Body mechanics (Done)    Learning Progress Summary    Learner Readiness Method Response Comment Documented by Status   Patient Acceptance E,D VU  DM 01/13/17 0833 Done    Acceptance E VU   01/11/17 1623 Done               Point: Precautions (Done)    Learning Progress Summary    Learner Readiness Method Response Comment Documented by Status   Patient Acceptance E,D VU  DM 01/13/17 0833 Done                      User Key     Initials Effective Dates Name Provider Type Discipline     05/18/15 -  Nae Bowman, PT Physical Therapist PT     05/18/15 -  Vic Dunn, PTA Physical Therapy Assistant PT     02/18/16 -  Katherin Soni PTA Physical Therapy Assistant PT     02/18/16 -  Hilaria Guerrero, PTA Physical Therapy Assistant PT                    PT Recommendation and Plan  Anticipated Discharge Disposition: extended care facility  Planned Therapy Interventions: balance training, bed mobility training, gait training, home exercise program, strengthening, transfer training, patient/family education  PT Frequency: daily  Plan of Care Review  Plan Of Care Reviewed With: patient  Progress: improving  Outcome Summary/Follow up Plan: pt required less assistance with bed mobility and functional transfers this date.          Outcome Measures       01/14/17 1100 01/13/17 0800 01/12/17 1600    How much help from another person do you currently  need...    Turning from your back to your side while in flat bed without using bedrails? 4  -MM 3  -DM 3  -JW    Moving from lying on back to sitting on the side of a flat bed without bedrails? 4  -MM 3  -DM 3  -JW    Moving to and from a bed to a chair (including a wheelchair)? 3  -MM 3  -DM 3  -JW    Standing up from a chair using your arms (e.g., wheelchair, bedside chair)? 3  -MM 3  -DM 3  -JW    Climbing 3-5 steps with a railing? 2  -MM 2  -DM 2  -JW    To walk in hospital room? 3  -MM 3  -DM 3  -JW    AM-PAC 6 Clicks Score 19  -MM 17  -DM 17  -JW    Functional Assessment    Outcome Measure Options  AM-PAC 6 Clicks Basic Mobility (PT)  -DM       01/11/17 1626          How much help from another person do you currently need...    Turning from your back to your side while in flat bed without using bedrails? 3  -KH      Moving from lying on back to sitting on the side of a flat bed without bedrails? 3  -KH      Moving to and from a bed to a chair (including a wheelchair)? 3  -KH      Standing up from a chair using your arms (e.g., wheelchair, bedside chair)? 3  -KH      Climbing 3-5 steps with a railing? 2  -KH      To walk in hospital room? 3  -KH      AM-PAC 6 Clicks Score 17  -KH      Functional Assessment    Outcome Measure Options AM-PAC 6 Clicks Basic Mobility (PT)  -        User Key  (r) = Recorded By, (t) = Taken By, (c) = Cosigned By    Initials Name Provider Type    DIMA Bowman, PT Physical Therapist    ALEAH Dunn, PTA Physical Therapy Assistant    JW Katherin Soni, PTA Physical Therapy Assistant    MM Hilaria Guerrero, PTA Physical Therapy Assistant           Time Calculation:         PT Charges       01/14/17 1153          Time Calculation    Start Time 1053  -MM      Stop Time 1107  -MM      Time Calculation (min) 14 min  -MM      PT Received On 01/14/17  -MM      PT - Next Appointment 01/15/17  -MM        User Key  (r) = Recorded By, (t) = Taken By, (c) = Cosigned By     Initials Name Provider Type     Hilaria Guerrero PTA Physical Therapy Assistant          Therapy Charges for Today     Code Description Service Date Service Provider Modifiers Qty    53299372681 HC PT THER PROC EA 15 MIN 1/14/2017 Hilaria Guerrero PTA GP 1          PT G-Codes  Outcome Measure Options: AM-PAC 6 Clicks Basic Mobility (PT)    Hilaria Guerrero PTA  1/14/2017

## 2017-01-15 LAB
ANION GAP SERPL CALCULATED.3IONS-SCNC: 11.4 MMOL/L
BASOPHILS # BLD AUTO: 0.04 10*3/MM3 (ref 0–0.2)
BASOPHILS NFR BLD AUTO: 0.9 % (ref 0–1.5)
BUN BLD-MCNC: 15 MG/DL (ref 8–23)
BUN/CREAT SERPL: 16.1 (ref 7–25)
CALCIUM SPEC-SCNC: 8.7 MG/DL (ref 8.2–9.6)
CHLORIDE SERPL-SCNC: 104 MMOL/L (ref 98–107)
CO2 SERPL-SCNC: 24.6 MMOL/L (ref 22–29)
CREAT BLD-MCNC: 0.93 MG/DL (ref 0.57–1)
DEPRECATED RDW RBC AUTO: 50.3 FL (ref 37–54)
EOSINOPHIL # BLD AUTO: 0.39 10*3/MM3 (ref 0–0.7)
EOSINOPHIL NFR BLD AUTO: 8.4 % (ref 0.3–6.2)
ERYTHROCYTE [DISTWIDTH] IN BLOOD BY AUTOMATED COUNT: 14 % (ref 11.7–13)
GFR SERPL CREATININE-BSD FRML MDRD: 56 ML/MIN/1.73
GLUCOSE BLD-MCNC: 85 MG/DL (ref 65–99)
HCT VFR BLD AUTO: 32.5 % (ref 35.6–45.5)
HGB BLD-MCNC: 10.2 G/DL (ref 11.9–15.5)
IMM GRANULOCYTES # BLD: 0 10*3/MM3 (ref 0–0.03)
IMM GRANULOCYTES NFR BLD: 0 % (ref 0–0.5)
LYMPHOCYTES # BLD AUTO: 1.21 10*3/MM3 (ref 0.9–4.8)
LYMPHOCYTES NFR BLD AUTO: 25.9 % (ref 19.6–45.3)
MAGNESIUM SERPL-MCNC: 2.2 MG/DL (ref 1.7–2.3)
MCH RBC QN AUTO: 30.8 PG (ref 26.9–32)
MCHC RBC AUTO-ENTMCNC: 31.4 G/DL (ref 32.4–36.3)
MCV RBC AUTO: 98.2 FL (ref 80.5–98.2)
MONOCYTES # BLD AUTO: 0.73 10*3/MM3 (ref 0.2–1.2)
MONOCYTES NFR BLD AUTO: 15.6 % (ref 5–12)
NEUTROPHILS # BLD AUTO: 2.3 10*3/MM3 (ref 1.9–8.1)
NEUTROPHILS NFR BLD AUTO: 49.2 % (ref 42.7–76)
PHOSPHATE SERPL-MCNC: 3.4 MG/DL (ref 2.5–4.5)
PLATELET # BLD AUTO: 361 10*3/MM3 (ref 140–500)
PMV BLD AUTO: 9.4 FL (ref 6–12)
POTASSIUM BLD-SCNC: 4.3 MMOL/L (ref 3.5–5.2)
RBC # BLD AUTO: 3.31 10*6/MM3 (ref 3.9–5.2)
SODIUM BLD-SCNC: 140 MMOL/L (ref 136–145)
WBC NRBC COR # BLD: 4.67 10*3/MM3 (ref 4.5–10.7)

## 2017-01-15 PROCEDURE — 25010000002 ENOXAPARIN PER 10 MG: Performed by: HOSPITALIST

## 2017-01-15 PROCEDURE — 84100 ASSAY OF PHOSPHORUS: CPT | Performed by: INTERNAL MEDICINE

## 2017-01-15 PROCEDURE — 25010000003 CEFAZOLIN PER 500 MG: Performed by: INTERNAL MEDICINE

## 2017-01-15 PROCEDURE — 80048 BASIC METABOLIC PNL TOTAL CA: CPT | Performed by: INTERNAL MEDICINE

## 2017-01-15 PROCEDURE — 85025 COMPLETE CBC W/AUTO DIFF WBC: CPT | Performed by: INTERNAL MEDICINE

## 2017-01-15 PROCEDURE — 97110 THERAPEUTIC EXERCISES: CPT

## 2017-01-15 PROCEDURE — 83735 ASSAY OF MAGNESIUM: CPT | Performed by: INTERNAL MEDICINE

## 2017-01-15 RX ADMIN — TRAMADOL HYDROCHLORIDE 50 MG: 50 TABLET, COATED ORAL at 20:32

## 2017-01-15 RX ADMIN — POLYETHYLENE GLYCOL 3350 17 G: 17 POWDER, FOR SOLUTION ORAL at 18:05

## 2017-01-15 RX ADMIN — CILOSTAZOL 100 MG: 100 TABLET ORAL at 18:05

## 2017-01-15 RX ADMIN — PRAMIPEXOLE DIHYDROCHLORIDE 0.25 MG: 0.25 TABLET ORAL at 20:32

## 2017-01-15 RX ADMIN — BACLOFEN 5 MG: 10 TABLET ORAL at 20:32

## 2017-01-15 RX ADMIN — CYCLOSPORINE 1 DROP: 0.5 EMULSION OPHTHALMIC at 20:32

## 2017-01-15 RX ADMIN — POLYETHYLENE GLYCOL 3350 17 G: 17 POWDER, FOR SOLUTION ORAL at 09:30

## 2017-01-15 RX ADMIN — HYDROCODONE BITARTRATE AND ACETAMINOPHEN 0.5 TABLET: 5; 325 TABLET ORAL at 18:05

## 2017-01-15 RX ADMIN — GABAPENTIN 300 MG: 300 CAPSULE ORAL at 18:05

## 2017-01-15 RX ADMIN — HYDROCODONE BITARTRATE AND ACETAMINOPHEN 0.5 TABLET: 5; 325 TABLET ORAL at 06:41

## 2017-01-15 RX ADMIN — CEFAZOLIN SODIUM 1 G: 1 INJECTION, SOLUTION INTRAVENOUS at 20:47

## 2017-01-15 RX ADMIN — ASPIRIN 81 MG: 81 TABLET, CHEWABLE ORAL at 09:30

## 2017-01-15 RX ADMIN — LOSARTAN POTASSIUM 50 MG: 50 TABLET, FILM COATED ORAL at 09:30

## 2017-01-15 RX ADMIN — LATANOPROST 1 DROP: 50 SOLUTION OPHTHALMIC at 20:32

## 2017-01-15 RX ADMIN — GABAPENTIN 300 MG: 300 CAPSULE ORAL at 09:30

## 2017-01-15 RX ADMIN — CYCLOSPORINE 1 DROP: 0.5 EMULSION OPHTHALMIC at 09:30

## 2017-01-15 RX ADMIN — CILOSTAZOL 100 MG: 100 TABLET ORAL at 09:30

## 2017-01-15 RX ADMIN — HYDROCODONE BITARTRATE AND ACETAMINOPHEN 0.5 TABLET: 5; 325 TABLET ORAL at 12:24

## 2017-01-15 RX ADMIN — CEFAZOLIN SODIUM 1 G: 1 INJECTION, SOLUTION INTRAVENOUS at 09:32

## 2017-01-15 RX ADMIN — DOCUSATE SODIUM 250 MG: 250 CAPSULE, LIQUID FILLED ORAL at 20:32

## 2017-01-15 RX ADMIN — FAMOTIDINE 20 MG: 20 TABLET, FILM COATED ORAL at 09:30

## 2017-01-15 RX ADMIN — SENNOSIDES 17.2 MG: 8.6 TABLET, FILM COATED ORAL at 20:32

## 2017-01-15 RX ADMIN — ENOXAPARIN SODIUM 30 MG: 30 INJECTION SUBCUTANEOUS at 09:30

## 2017-01-15 NOTE — PROGRESS NOTES
LOS: 5 days     Name: Juliana Gardner  Age/Sex: 89 y.o. female  :  1918        PCP: Wanda Noel MD    Subjective   She continues to improve daily no new complaints today  General: No Fever or Chills, Cardiac: No Chest Pain or Palpitations, Resp: No Cough or SOA, GI: No Nausea, Vomiting, or Diarrhea and Other: No bleeding      aspirin 81 mg Oral Daily   baclofen 5 mg Oral Nightly   ceFAZolin 1 g Intravenous Q12H   cilostazol 100 mg Oral BID   cycloSPORINE 1 drop Both Eyes Q12H   docusate sodium 250 mg Oral Nightly   enoxaparin 30 mg Subcutaneous Q24H   famotidine 20 mg Oral Daily   gabapentin 300 mg Oral BID   HYDROcodone-acetaminophen 0.5 tablet Oral Q6H   latanoprost 1 drop Both Eyes Nightly   losartan 50 mg Oral Daily   polyethylene glycol 17 g Oral BID   pramipexole 0.25 mg Oral Nightly   senna 2 tablet Oral Nightly   traMADol 50 mg Oral Nightly          Objective   Vital Signs  Temp:  [98 °F (36.7 °C)-99 °F (37.2 °C)] 99 °F (37.2 °C)  Heart Rate:  [71-85] 71  Resp:  [18-20] 18  BP: (111-131)/(49-64) 131/63  Body mass index is 20.97 kg/(m^2).  No intake or output data in the 24 hours ending 01/15/17 0954    General:  Resting comfortably no active distress  Eyes:  PERRL; no conj pallor; EOMI; no scleral icterus  ENT:  oral mucosa moist; pharynx w/o exudate; ext inspect WNL  Neck: nl movement; no JVD; no adenopathy; thyroid WNL  Lungs:  CTA; good air entry; Non labored; No wheeze, No Crackle, No Rhonchi  Cardiac:  RRR 3/6murmur;  nl S1/S2  Abd:  soft; non tender; non distended; no HSM; no masses; BS +  : deferred  Musc/Skel: no arthropathy,bilateral lower extremities appear improved dressing applied  Skin:  warm and perfused; no apparent rash on examined areas  Neuro: CN grossly intact; no focal deficit of strength or sensory   Ext/P Vasc:  peripheral pulses 2+; no clubbing ; no cyanosis; no edema  MS & Psychiatric: A&O x  3; memory intact; affect/mood appropriate    Results Review:       I  reviewed the patient's new clinical results.    Results from last 7 days  Lab Units 01/15/17  0652 01/14/17  0722 01/13/17  0649 01/12/17  0730 01/11/17  0717 01/10/17  1232   WBC 10*3/mm3 4.67 4.54 4.63 4.40* 4.93 6.34   HEMOGLOBIN g/dL 10.2* 9.8* 10.4* 10.1* 9.4* 10.4*   PLATELETS 10*3/mm3 361 290 298 299 266 318       Results from last 7 days  Lab Units 01/15/17  0652 01/14/17  0722 01/13/17  0649 01/12/17  0730 01/11/17  0717 01/10/17  1232   SODIUM mmol/L 140 140 142 141 139 140   POTASSIUM mmol/L 4.3 4.1 4.4 4.3 4.3 4.5   CHLORIDE mmol/L 104 103 104 105 105 102   TOTAL CO2 mmol/L 24.6 25.5 27.4 25.9 24.8 26.3   BUN mg/dL 15 16 16 17 17 16   CREATININE mg/dL 0.93 0.88 0.95 0.94 1.06* 1.16*   CALCIUM mg/dL 8.7 8.6 8.7 8.6 8.5 9.1   MAGNESIUM mg/dL 2.2 2.2 2.2 2.2 2.1 2.3   PHOSPHORUS mg/dL 3.4 3.3 3.3 3.4 3.4 3.6   Estimated Creatinine Clearance: 31.4 mL/min (by C-G formula based on Cr of 0.93).      Assessment/Plan   Active Problems:    Cellulitis of lower extremity    Venous stasis dermatitis of both lower extremities    Open leg wound    DNR (do not resuscitate)    Heart murmur    Cellulitis    Venous insufficiency of both lower extremities      PLAN  1. Bilateral lower extremity cellulitis: Continues to improve on IV Cefizolin if remains afebrile consider change to oral antibiotics, continue IV antibiotics for now while inpatient  2. Venous stasis dermatitis bilateral lower extremities:  Continue wound care  3. Venous stasis ulcerations:  Wound care is evaluating appreciated vascular's input.  Continue wraps manage edema  4. Heart murmur:Chronic issue no acute workup indicated  5. chronic kidney disease: Stable    Disposition  Dr. Albrecht will evaluate again on Monday maybe ready for discharge early next week      Ash Chase MD  [facility]  01/15/17  9:54 AM      EMR Dragon/Transcription disclaimer:     Much of this encounter note is an electronic transcription/translation of  spoken language to printed text. The electronic translation of spoken language may permit erroneous, or at times, nonsensical words or phrases to be inadvertently transcribed; Although I have reviewed the note for such errors, some may still exist.

## 2017-01-15 NOTE — PLAN OF CARE
Problem: Patient Care Overview (Adult)  Goal: Plan of Care Review  Outcome: Ongoing (interventions implemented as appropriate)    01/15/17 1554   Coping/Psychosocial Response Interventions   Plan Of Care Reviewed With patient   Patient Care Overview   Progress improving   Outcome Evaluation   Outcome Summary/Follow up Plan pt denies any new complaints. up with one assist, remains free from falls. up in chair. pain minimal in ble with scheduled lortab given. alert and oriented.        Goal: Adult Individualization and Mutuality  Outcome: Ongoing (interventions implemented as appropriate)  Goal: Discharge Needs Assessment  Outcome: Ongoing (interventions implemented as appropriate)    Problem: Fall Risk (Adult)  Goal: Absence of Falls  Outcome: Ongoing (interventions implemented as appropriate)    01/15/17 1554   Fall Risk (Adult)   Absence of Falls making progress toward outcome         Problem: Pain, Acute (Adult)  Goal: Acceptable Pain Control/Comfort Level  Outcome: Ongoing (interventions implemented as appropriate)    01/15/17 1554   Pain, Acute (Adult)   Acceptable Pain Control/Comfort Level making progress toward outcome

## 2017-01-15 NOTE — PROGRESS NOTES
Acute Care - Physical Therapy Treatment Note  [facility]     Patient Name: Juliana Gardner  : 1918  MRN: [id number]  Today's Date: 1/15/2017             Admit Date: 1/10/2017    Visit Dx:    ICD-10-CM ICD-9-CM   1. Difficulty walking R26.2 719.7     Patient Active Problem List   Diagnosis   • Cellulitis of lower extremity   • Venous stasis dermatitis of both lower extremities   • Open leg wound   • DNR (do not resuscitate)   • Heart murmur   • Cellulitis   • Venous insufficiency of both lower extremities               Adult Rehabilitation Note       01/15/17 1500 17 1150 17 0830    Rehab Assessment/Intervention    Discipline physical therapy assistant  -CW physical therapy assistant  -MM physical therapy assistant  -DM    Document Type therapy note (daily note)  -CW therapy note (daily note)  -MM therapy note (daily note)  -DM    Subjective Information agree to therapy;complains of;pain  -CW agree to therapy  -MM agree to therapy  -DM    Patient Effort, Rehab Treatment good  -CW good  -MM good  -DM    Symptoms Noted During/After Treatment   fatigue  -DM    Precautions/Limitations fall precautions  -CW fall precautions  -MM fall precautions   skin integrity, BLE wounds  -DM    Recorded by [CW] Pavel Tellez [MM] Hilaria Guerrero, PTA [DM] Vic Dunn PTA    Pain Assessment    Pain Assessment 0-10  -CW No/denies pain  -MM 0-10  -DM    Pain Score 7   When amb with RWX there is pain in B LE  -CW  3  -DM    Pain Location   Leg  -DM    Pain Orientation   Right;Left  -DM    Pain Intervention(s)   Repositioned  -DM    Recorded by [CW] Pavel Tellez [MM] Hilaria Guerrero PTA [DM] Vic Dunn PTA    Cognitive Assessment/Intervention    Current Cognitive/Communication Assessment functional  -CW      Orientation Status oriented x 4  -CW      Follows Commands/Answers Questions 100% of the time  -CW      Personal Safety WNL/WFL  -CW      Personal Safety Interventions fall prevention program  maintained;gait belt;nonskid shoes/slippers when out of bed  -CW      Recorded by [CW] Pavel Tellez      Bed Mobility, Assessment/Treatment    Bed Mobility, Assistive Device bed rails;head of bed elevated  -CW bed rails;head of bed elevated  -MM     Bed Mob, Supine to Sit, Ray supervision required;verbal cues required;nonverbal cues required (demo/gesture)  -CW supervision required  -MM contact guard assist  -DM    Bed Mob, Sit to Supine, Ray supervision required;verbal cues required;nonverbal cues required (demo/gesture)  -CW not tested   pt sat up in chair  -MM contact guard assist  -DM    Recorded by [CW] Pavel Tellez [MM] Hilaria Guerrero, PTA [DM] Vic Dunn PTA    Transfer Assessment/Treatment    Transfers, Sit-Stand Ray verbal cues required;nonverbal cues required (demo/gesture);contact guard assist  -CW verbal cues required;contact guard assist;1 person + 1 person to manage equipment  -MM minimum assist (75% patient effort)  -DM    Transfers, Stand-Sit Ray verbal cues required;nonverbal cues required (demo/gesture);contact guard assist  -CW verbal cues required;contact guard assist;1 person + 1 person to manage equipment  -MM contact guard assist  -DM    Transfers, Sit-Stand-Sit, Assist Device rolling walker  -CW rolling walker  -MM rolling walker  -DM    Recorded by [CW] Pavel Tellez [MM] Hilaria Guerrero PTA [DM] Vic Dunn PTA    Gait Assessment/Treatment    Gait, [place] Level verbal cues required;nonverbal cues required (demo/gesture);contact guard assist  -CW verbal cues required;contact guard assist  -MM contact guard assist  -DM    Gait, Assistive Device rolling walker  -CW rolling walker  -MM rolling walker  -DM    Gait, Distance (Feet) 80  -CW 30  -MM 20  -DM    Gait, Gait Deviations leda decreased;step length decreased;stride length decreased  -CW leda decreased;forward flexed posture;step length decreased  -MM leda  decreased;forward flexed posture;limb motion velocity decreased;step length decreased;stride length decreased;toe-to-floor clearance decreased  -DM    Gait, Comment   fatigue limiting  -DM    Recorded by [CW] Pavel Tellez [MM] Hilaria Guerrero PTA [DM] Vic Dunn PTA    Positioning and Restraints    Pre-Treatment Position in bed  -CW in bed  -MM in bed  -DM    Post Treatment Position bed  -CW chair  -MM bed  -DM    In Bed supine;call light within reach;encouraged to call for assist;with family/caregiver  -CW  supine;encouraged to call for assist;call light within reach;exit alarm on  -DM    In Chair  sitting;call light within reach;encouraged to call for assist;exit alarm on;RLE elevated;LLE elevated  -MM     Recorded by [CW] Pavel Tellez [MM] Hilaria Guerrero PTA [DM] Vic Dunn PTA      01/12/17 1646          Rehab Assessment/Intervention    Discipline physical therapy assistant  -      Document Type therapy note (daily note)  -JW      Subjective Information agree to therapy  -JW      Symptoms Noted During/After Treatment increased pain  -JW      Precautions/Limitations fall precautions   skin integrity  -JW      Recorded by [JW] Katherin Soni PTA      Pain Assessment    Pain Assessment FLACC  -      Pain Score 5  -JW      Pain Location Leg  -JW      Pain Orientation Left  -JW      Pain Intervention(s) Repositioned  -JW      Recorded by [JW] Katherin Soni PTA      Cognitive Assessment/Intervention    Personal Safety Interventions fall prevention program maintained;gait belt  -JW      Recorded by [JW] Katherin Soni PTA      Gait Assessment/Treatment    Gait, [place] Level minimum assist (75% patient effort);2 person assist required  -JW      Gait, Distance (Feet) 15  -JW      Gait, Gait Deviations leda decreased;decreased heel strike;step length decreased;forward flexed posture  -JW      Recorded by [JW] Katherin Soni PTA      Positioning and Restraints     Pre-Treatment Position in bed  -JW      Post Treatment Position bed  -JW      In Bed supine;call light within reach;encouraged to call for assist;exit alarm on  -JW      Recorded by [JW] Katherin Soni PTA        User Key  (r) = Recorded By, (t) = Taken By, (c) = Cosigned By    Initials Name Effective Dates    DM Vic Dunn, PTA 05/18/15 -     JW Katherin Soni, PTA 02/18/16 -     MM Hilaria Guerrero, PTA 02/18/16 -     CW Pavel Tellez 12/13/16 -                 IP PT Goals       01/11/17 1623          Bed Mobility PT LTG    Bed Mobility PT LTG, Date Established 01/11/17  -KH      Bed Mobility PT LTG, Time to Achieve 1 wk  -KH      Bed Mobility PT LTG, Activity Type all bed mobility  -KH      Bed Mobility PT LTG, [employer] Level supervision required  -KH      Transfer Training PT LTG    Transfer Training PT LTG, Date Established 01/11/17  -KH      Transfer Training PT LTG, Time to Achieve 1 wk  -KH      Transfer Training PT LTG, Activity Type all transfers  -KH      Transfer Training PT LTG, [employer] [place] Level supervision required  -KH      Transfer Training PT LTG, Assist Device walker, rolling  -KH      Gait Training PT LTG    Gait Training Goal PT LTG, Date Established 01/11/17  -KH      Gait Training Goal PT LTG, Time to Achieve 1 wk  -KH      Gait Training Goal PT LTG, [employer] [place] Level supervision required  -KH      Gait Training Goal PT LTG, Assist Device walker, rolling  -KH      Gait Training Goal PT LTG, Distance to Achieve 150 ft  -KH      Patient Education PT LTG    Patient Education PT LTG, Date Established 01/11/17  -KH      Patient Education PT LTG, Time to Achieve 1 wk  -KH      Patient Education PT LTG, Education Type HEP  -KH      Patient Education PT LTG, Education Understanding demonstrate adequately  -KH        User Key  (r) = Recorded By, (t) = Taken By, (c) = Cosigned By    Initials Name Provider Type    DIMA Bowman, PT Physical Therapist          Physical  Therapy Education     Title: PT OT SLP Therapies (Done)     Topic: Physical Therapy (Done)     Point: Mobility training (Done)    Learning Progress Summary    Learner Readiness Method Response Comment Documented by Status   Patient Acceptance E,TB DU,VU  CW 01/15/17 1517 Done    Acceptance E DU  MM 01/14/17 1152 Done    Acceptance E,D VU  DM 01/13/17 0833 Done    Acceptance E VU  JW 01/12/17 1651 Done    Acceptance E VU  KH 01/11/17 1623 Done               Point: Home exercise program (Done)    Learning Progress Summary    Learner Readiness Method Response Comment Documented by Status   Patient Acceptance E,TB DU,VU  CW 01/15/17 1517 Done    Acceptance E,D VU  DM 01/13/17 0833 Done    Acceptance E VU  KH 01/11/17 1623 Done               Point: Body mechanics (Done)    Learning Progress Summary    Learner Readiness Method Response Comment Documented by Status   Patient Acceptance E,TB DU,VU  CW 01/15/17 1517 Done    Acceptance E,D VU  DM 01/13/17 0833 Done    Acceptance E VU  KH 01/11/17 1623 Done               Point: Precautions (Done)    Learning Progress Summary    Learner Readiness Method Response Comment Documented by Status   Patient Acceptance E,TB DU,VU  CW 01/15/17 1517 Done    Acceptance E,D VU  DM 01/13/17 0833 Done                      User Key     Initials Effective Dates Name Provider Type Discipline     05/18/15 -  Nae Bowman, PT Physical Therapist PT     05/18/15 -  Vic Dunn, PTA Physical Therapy Assistant PT    JW 02/18/16 -  Katherin Soni, PTA Physical Therapy Assistant PT    MM 02/18/16 -  Hilaria Guerrero, PTA Physical Therapy Assistant PT     12/13/16 -  Pavel Tellez Physical Therapy Assistant PT                    PT Recommendation and Plan  Anticipated Discharge Disposition: extended care facility  Planned Therapy Interventions: balance training, bed mobility training, gait training, home exercise program, strengthening, transfer training, patient/family  education  PT Frequency: daily  Plan of Care Review  Plan Of Care Reviewed With: patient  Progress: improving  Outcome Summary/Follow up Plan: Pt increasing with strength safety with transfers and amb with RWX          Outcome Measures       01/15/17 1500 01/14/17 1100 01/13/17 0800    How much help from another person do you currently need...    Turning from your back to your side while in flat bed without using bedrails? 4  -CW 4  -MM 3  -DM    Moving from lying on back to sitting on the side of a flat bed without bedrails? 4  -CW 4  -MM 3  -DM    Moving to and from a bed to a chair (including a wheelchair)? 3  -CW 3  -MM 3  -DM    Standing up from a chair using your arms (e.g., wheelchair, bedside chair)? 3  -CW 3  -MM 3  -DM    Climbing 3-5 steps with a railing? 2  -CW 2  -MM 2  -DM    To walk in hospital room? 3  -CW 3  -MM 3  -DM    AM-PAC 6 Clicks Score 19  -CW 19  -MM 17  -DM    Functional Assessment    Outcome Measure Options AM-PAC 6 Clicks Basic Mobility (PT)  -CW  AM-PAC 6 Clicks Basic Mobility (PT)  -DM      01/12/17 1600          How much help from another person do you currently need...    Turning from your back to your side while in flat bed without using bedrails? 3  -JW      Moving from lying on back to sitting on the side of a flat bed without bedrails? 3  -JW      Moving to and from a bed to a chair (including a wheelchair)? 3  -JW      Standing up from a chair using your arms (e.g., wheelchair, bedside chair)? 3  -JW      Climbing 3-5 steps with a railing? 2  -JW      To walk in hospital room? 3  -JW      AM-PAC 6 Clicks Score 17  -JW        User Key  (r) = Recorded By, (t) = Taken By, (c) = Cosigned By    Initials Name Provider Type    ALEAH Dunn, PTA Physical Therapy Assistant    HOLLI Soni, PTA Physical Therapy Assistant    MM Hilaria Guerrero, PTA Physical Therapy Assistant    CW Pavel Tellez Physical Therapy Assistant           Time Calculation:         PT Charges        01/15/17 1519          Time Calculation    Start Time 1502  -CW      Stop Time 1519  -CW      Time Calculation (min) 17 min  -CW      PT Received On 01/15/17  -CW      PT - Next Appointment 01/16/17  -CW        User Key  (r) = Recorded By, (t) = Taken By, (c) = Cosigned By    Initials Name Provider Type    CW Pavel Tellez Physical Therapy Assistant          Therapy Charges for Today     Code Description Service Date Service Provider Modifiers Qty    31023456352 HC PT THER SUPP EA 15 MIN 1/15/2017 Pavel Tellez GP 1    74924416394 HC PT THER PROC EA 15 MIN 1/15/2017 Pavel Tellez GP 1          PT G-Codes  Outcome Measure Options: AM-PAC 6 Clicks Basic Mobility (PT)    Pavel Tellez  1/15/2017

## 2017-01-15 NOTE — PLAN OF CARE
Problem: Patient Care Overview (Adult)  Goal: Plan of Care Review  Outcome: Ongoing (interventions implemented as appropriate)    01/15/17 0632   Coping/Psychosocial Response Interventions   Plan Of Care Reviewed With patient   Patient Care Overview   Progress improving   Outcome Evaluation   Outcome Summary/Follow up Plan Pt rested well most of the night. 1 c/o pain, pain meds given with good relief. Cont IV abx. VSS, no s/s acute distress noted       Goal: Adult Individualization and Mutuality  Outcome: Ongoing (interventions implemented as appropriate)    Problem: Fall Risk (Adult)  Goal: Absence of Falls  Outcome: Ongoing (interventions implemented as appropriate)    Problem: Skin Integrity Impairment, Risk/Actual (Adult)  Goal: Skin Integrity/Wound Healing  Outcome: Ongoing (interventions implemented as appropriate)    Problem: Pain, Acute (Adult)  Goal: Acceptable Pain Control/Comfort Level  Outcome: Ongoing (interventions implemented as appropriate)

## 2017-01-15 NOTE — PLAN OF CARE
Problem: Patient Care Overview (Adult)  Goal: Plan of Care Review  Outcome: Ongoing (interventions implemented as appropriate)    01/15/17 1517   Coping/Psychosocial Response Interventions   Plan Of Care Reviewed With patient   Patient Care Overview   Progress improving   Outcome Evaluation   Outcome Summary/Follow up Plan Pt increasing with strength safety with transfers and amb with RWX

## 2017-01-16 LAB
ANION GAP SERPL CALCULATED.3IONS-SCNC: 11 MMOL/L
BASOPHILS # BLD AUTO: 0.04 10*3/MM3 (ref 0–0.2)
BASOPHILS NFR BLD AUTO: 0.9 % (ref 0–1.5)
BUN BLD-MCNC: 8 MG/DL (ref 8–23)
BUN/CREAT SERPL: 9.6 (ref 7–25)
CALCIUM SPEC-SCNC: 8.6 MG/DL (ref 8.2–9.6)
CHLORIDE SERPL-SCNC: 105 MMOL/L (ref 98–107)
CO2 SERPL-SCNC: 25 MMOL/L (ref 22–29)
CREAT BLD-MCNC: 0.83 MG/DL (ref 0.57–1)
DEPRECATED RDW RBC AUTO: 48.5 FL (ref 37–54)
EOSINOPHIL # BLD AUTO: 0.52 10*3/MM3 (ref 0–0.7)
EOSINOPHIL NFR BLD AUTO: 11.6 % (ref 0.3–6.2)
ERYTHROCYTE [DISTWIDTH] IN BLOOD BY AUTOMATED COUNT: 13.8 % (ref 11.7–13)
GFR SERPL CREATININE-BSD FRML MDRD: 63 ML/MIN/1.73
GLUCOSE BLD-MCNC: 89 MG/DL (ref 65–99)
HCT VFR BLD AUTO: 31.3 % (ref 35.6–45.5)
HGB BLD-MCNC: 9.8 G/DL (ref 11.9–15.5)
IMM GRANULOCYTES # BLD: 0 10*3/MM3 (ref 0–0.03)
IMM GRANULOCYTES NFR BLD: 0 % (ref 0–0.5)
LYMPHOCYTES # BLD AUTO: 1.29 10*3/MM3 (ref 0.9–4.8)
LYMPHOCYTES NFR BLD AUTO: 28.7 % (ref 19.6–45.3)
MAGNESIUM SERPL-MCNC: 2.2 MG/DL (ref 1.7–2.3)
MCH RBC QN AUTO: 30.3 PG (ref 26.9–32)
MCHC RBC AUTO-ENTMCNC: 31.3 G/DL (ref 32.4–36.3)
MCV RBC AUTO: 96.9 FL (ref 80.5–98.2)
MONOCYTES # BLD AUTO: 0.7 10*3/MM3 (ref 0.2–1.2)
MONOCYTES NFR BLD AUTO: 15.6 % (ref 5–12)
NEUTROPHILS # BLD AUTO: 1.94 10*3/MM3 (ref 1.9–8.1)
NEUTROPHILS NFR BLD AUTO: 43.2 % (ref 42.7–76)
PHOSPHATE SERPL-MCNC: 3.2 MG/DL (ref 2.5–4.5)
PLATELET # BLD AUTO: 270 10*3/MM3 (ref 140–500)
PMV BLD AUTO: 9.1 FL (ref 6–12)
POTASSIUM BLD-SCNC: 4.3 MMOL/L (ref 3.5–5.2)
RBC # BLD AUTO: 3.23 10*6/MM3 (ref 3.9–5.2)
SODIUM BLD-SCNC: 141 MMOL/L (ref 136–145)
WBC NRBC COR # BLD: 4.49 10*3/MM3 (ref 4.5–10.7)

## 2017-01-16 PROCEDURE — 25010000003 CEFAZOLIN PER 500 MG: Performed by: INTERNAL MEDICINE

## 2017-01-16 PROCEDURE — 80048 BASIC METABOLIC PNL TOTAL CA: CPT | Performed by: INTERNAL MEDICINE

## 2017-01-16 PROCEDURE — 97110 THERAPEUTIC EXERCISES: CPT

## 2017-01-16 PROCEDURE — 84100 ASSAY OF PHOSPHORUS: CPT | Performed by: INTERNAL MEDICINE

## 2017-01-16 PROCEDURE — 83735 ASSAY OF MAGNESIUM: CPT | Performed by: INTERNAL MEDICINE

## 2017-01-16 PROCEDURE — 25010000002 ENOXAPARIN PER 10 MG: Performed by: HOSPITALIST

## 2017-01-16 PROCEDURE — 85025 COMPLETE CBC W/AUTO DIFF WBC: CPT | Performed by: INTERNAL MEDICINE

## 2017-01-16 RX ADMIN — CILOSTAZOL 100 MG: 100 TABLET ORAL at 18:54

## 2017-01-16 RX ADMIN — CYCLOSPORINE 1 DROP: 0.5 EMULSION OPHTHALMIC at 20:36

## 2017-01-16 RX ADMIN — ASPIRIN 81 MG: 81 TABLET, CHEWABLE ORAL at 09:12

## 2017-01-16 RX ADMIN — PRAMIPEXOLE DIHYDROCHLORIDE 0.25 MG: 0.25 TABLET ORAL at 20:36

## 2017-01-16 RX ADMIN — HYDROCODONE BITARTRATE AND ACETAMINOPHEN 0.5 TABLET: 5; 325 TABLET ORAL at 01:09

## 2017-01-16 RX ADMIN — GABAPENTIN 300 MG: 300 CAPSULE ORAL at 18:54

## 2017-01-16 RX ADMIN — FAMOTIDINE 20 MG: 20 TABLET, FILM COATED ORAL at 09:12

## 2017-01-16 RX ADMIN — CEFAZOLIN SODIUM 1 G: 1 INJECTION, SOLUTION INTRAVENOUS at 09:12

## 2017-01-16 RX ADMIN — POLYETHYLENE GLYCOL 3350 17 G: 17 POWDER, FOR SOLUTION ORAL at 18:54

## 2017-01-16 RX ADMIN — LATANOPROST 1 DROP: 50 SOLUTION OPHTHALMIC at 20:36

## 2017-01-16 RX ADMIN — ENOXAPARIN SODIUM 30 MG: 30 INJECTION SUBCUTANEOUS at 09:12

## 2017-01-16 RX ADMIN — POLYETHYLENE GLYCOL 3350 17 G: 17 POWDER, FOR SOLUTION ORAL at 09:12

## 2017-01-16 RX ADMIN — BACLOFEN 5 MG: 10 TABLET ORAL at 20:36

## 2017-01-16 RX ADMIN — HYDROCODONE BITARTRATE AND ACETAMINOPHEN 0.5 TABLET: 5; 325 TABLET ORAL at 18:54

## 2017-01-16 RX ADMIN — CYCLOSPORINE 1 DROP: 0.5 EMULSION OPHTHALMIC at 12:12

## 2017-01-16 RX ADMIN — HYDROCODONE BITARTRATE AND ACETAMINOPHEN 0.5 TABLET: 5; 325 TABLET ORAL at 06:45

## 2017-01-16 RX ADMIN — GABAPENTIN 300 MG: 300 CAPSULE ORAL at 09:12

## 2017-01-16 RX ADMIN — DOCUSATE SODIUM 250 MG: 250 CAPSULE, LIQUID FILLED ORAL at 20:36

## 2017-01-16 RX ADMIN — LOSARTAN POTASSIUM 50 MG: 50 TABLET, FILM COATED ORAL at 09:12

## 2017-01-16 RX ADMIN — SENNOSIDES 17.2 MG: 8.6 TABLET, FILM COATED ORAL at 20:36

## 2017-01-16 RX ADMIN — TRAMADOL HYDROCHLORIDE 50 MG: 50 TABLET, COATED ORAL at 20:36

## 2017-01-16 RX ADMIN — HYDROCODONE BITARTRATE AND ACETAMINOPHEN 0.5 TABLET: 5; 325 TABLET ORAL at 12:15

## 2017-01-16 RX ADMIN — CEFAZOLIN SODIUM 1 G: 1 INJECTION, SOLUTION INTRAVENOUS at 20:36

## 2017-01-16 RX ADMIN — CILOSTAZOL 100 MG: 100 TABLET ORAL at 09:12

## 2017-01-16 NOTE — PROGRESS NOTES
Bo Albrecht MD       LOS: 6 days   Patient Care Team:  Wanda Noel MD as PCP - General (Internal Medicine)    Subjective     89 y.o. female venous shortness of breath overnight.  Patient tolerating compression without issue.    Review of Systems    Objective     Vital Signs  Temp:  [97.9 °F (36.6 °C)-98.2 °F (36.8 °C)] 97.9 °F (36.6 °C)  Heart Rate:  [65-81] 65  Resp:  [20] 20  BP: (113-118)/(57-58) 117/58    Physical Exam:  Physical Exam   Constitutional: She is oriented to person, place, and time. She appears well-developed and well-nourished.   Eyes: Pupils are equal, round, and reactive to light. No scleral icterus.   Neck: Normal range of motion. No JVD present.   Pulmonary/Chest: Effort normal. No respiratory distress.   Abdominal: Soft. She exhibits no distension.   Musculoskeletal: She exhibits edema.   Neurological: She is alert and oriented to person, place, and time.   Skin: There is erythema. No pallor.   Psychiatric: She has a normal mood and affect. Her behavior is normal.       Results Review:       Recent Results (from the past 12 hour(s))   Basic Metabolic Panel    Collection Time: 01/16/17  4:53 AM   Result Value Ref Range    Glucose 89 65 - 99 mg/dL    BUN 8 8 - 23 mg/dL    Creatinine 0.83 0.57 - 1.00 mg/dL    Sodium 141 136 - 145 mmol/L    Potassium 4.3 3.5 - 5.2 mmol/L    Chloride 105 98 - 107 mmol/L    CO2 25.0 22.0 - 29.0 mmol/L    Calcium 8.6 8.2 - 9.6 mg/dL    eGFR Non African Amer 63 >60 mL/min/1.73    BUN/Creatinine Ratio 9.6 7.0 - 25.0    Anion Gap 11.0 mmol/L   Magnesium    Collection Time: 01/16/17  4:53 AM   Result Value Ref Range    Magnesium 2.2 1.7 - 2.3 mg/dL   Phosphorus    Collection Time: 01/16/17  4:53 AM   Result Value Ref Range    Phosphorus 3.2 2.5 - 4.5 mg/dL   CBC Auto Differential    Collection Time: 01/16/17  4:53 AM   Result Value Ref Range    WBC 4.49 (L) 4.50 - 10.70 10*3/mm3    RBC 3.23 (L) 3.90 - 5.20 10*6/mm3    Hemoglobin 9.8 (L) 11.9 - 15.5 g/dL     Hematocrit 31.3 (L) 35.6 - 45.5 %    MCV 96.9 80.5 - 98.2 fL    MCH 30.3 26.9 - 32.0 pg    MCHC 31.3 (L) 32.4 - 36.3 g/dL    RDW 13.8 (H) 11.7 - 13.0 %    RDW-SD 48.5 37.0 - 54.0 fl    MPV 9.1 6.0 - 12.0 fL    Platelets 270 140 - 500 10*3/mm3    Neutrophil % 43.2 42.7 - 76.0 %    Lymphocyte % 28.7 19.6 - 45.3 %    Monocyte % 15.6 (H) 5.0 - 12.0 %    Eosinophil % 11.6 (H) 0.3 - 6.2 %    Basophil % 0.9 0.0 - 1.5 %    Immature Grans % 0.0 0.0 - 0.5 %    Neutrophils, Absolute 1.94 1.90 - 8.10 10*3/mm3    Lymphocytes, Absolute 1.29 0.90 - 4.80 10*3/mm3    Monocytes, Absolute 0.70 0.20 - 1.20 10*3/mm3    Eosinophils, Absolute 0.52 0.00 - 0.70 10*3/mm3    Basophils, Absolute 0.04 0.00 - 0.20 10*3/mm3    Immature Grans, Absolute 0.00 0.00 - 0.03 10*3/mm3   ]      Assessment/Plan     Active Problems:    Cellulitis of lower extremity    Venous stasis dermatitis of both lower extremities    Open leg wound    DNR (do not resuscitate)    Heart murmur    Cellulitis    Venous insufficiency of both lower extremities         89 y.o. female venous stasis ulcers with superimposed cellulitis. The cellulitis is markedly improving.   Patient tolerating compression well.  Continue with current dressing changes. She is improving and could be managed at the [facility] when she is stable medically .    Bo Albrecht MD  01/16/17  12:41 PM    Please call my office with any question: [phone]

## 2017-01-16 NOTE — NURSING NOTE
Wound Care BLE's. Vascular wounds cleanse with saline. Hydrofiber silver applied open wounds cover with kerlix and ace wraps. Decrease erythema. Slight decrease in depth vascular wounds left leg. Wound base red small amount slough noted.

## 2017-01-16 NOTE — PLAN OF CARE
Problem: Patient Care Overview (Adult)  Goal: Plan of Care Review  Outcome: Ongoing (interventions implemented as appropriate)    01/16/17 0852   Coping/Psychosocial Response Interventions   Plan Of Care Reviewed With patient   Outcome Evaluation   Outcome Summary/Follow up Plan Pt w/ pain and fatigue limiting. Increased fatigue after using restroom. Cues for safety and to stay close to RW.`   `````````````````

## 2017-01-16 NOTE — PLAN OF CARE
Problem: Patient Care Overview (Adult)  Goal: Plan of Care Review  Outcome: Ongoing (interventions implemented as appropriate)    01/16/17 0550   Coping/Psychosocial Response Interventions   Plan Of Care Reviewed With patient   Patient Care Overview   Progress improving   Outcome Evaluation   Outcome Summary/Follow up Plan Patient continues IV antibiotic therapy for Bilateral lower extremity cellulitis. Dressing intact lower extremiry.. Routine pain medications given. No acute distress noted at this time.        Goal: Adult Individualization and Mutuality  Outcome: Ongoing (interventions implemented as appropriate)  Goal: Discharge Needs Assessment  Outcome: Ongoing (interventions implemented as appropriate)    Problem: Fall Risk (Adult)  Goal: Absence of Falls  Outcome: Ongoing (interventions implemented as appropriate)    Problem: Skin Integrity Impairment, Risk/Actual (Adult)  Goal: Skin Integrity/Wound Healing  Outcome: Ongoing (interventions implemented as appropriate)    Problem: Pain, Acute (Adult)  Goal: Acceptable Pain Control/Comfort Level  Outcome: Ongoing (interventions implemented as appropriate)

## 2017-01-16 NOTE — PROGRESS NOTES
Acute Care - Physical Therapy Treatment Note  [facility]     Patient Name: Juliana Gardner  : 1918  MRN: [id number]  Today's Date: 2017             Admit Date: 1/10/2017    Visit Dx:    ICD-10-CM ICD-9-CM   1. Difficulty walking R26.2 719.7     Patient Active Problem List   Diagnosis   • Cellulitis of lower extremity   • Venous stasis dermatitis of both lower extremities   • Open leg wound   • DNR (do not resuscitate)   • Heart murmur   • Cellulitis   • Venous insufficiency of both lower extremities               Adult Rehabilitation Note       17 0800 01/15/17 1500 17 1150    Rehab Assessment/Intervention    Discipline physical therapy assistant  -RW physical therapy assistant  -CW physical therapy assistant  -MM    Document Type therapy note (daily note)  -RW therapy note (daily note)  -CW therapy note (daily note)  -MM    Subjective Information agree to therapy;complains of;pain  -RW agree to therapy;complains of;pain  -CW agree to therapy  -MM    Patient Effort, Rehab Treatment good  -RW good  -CW good  -MM    Precautions/Limitations fall precautions  -RW fall precautions  -CW fall precautions  -MM    Recorded by [RW] Priscilla Salmeron PTA [CW] Pavel Tellez [MM] Hilaria Guerrero PTA    Pain Assessment    Pain Assessment 0-10  -RW 0-10  -CW No/denies pain  -MM    Pain Score 7  -RW 7   When amb with RWX there is pain in B LE  -CW     Pain Type Acute pain  -RW      Pain Location Leg  -RW      Pain Orientation Right;Left  -RW      Pain Intervention(s) Medication (See MAR);Repositioned;Ambulation/increased activity  -RW      Response to Interventions tolerated  -RW      Recorded by [RW] Priscilla Salmeron PTA [CW] Pavel Tellez [MM] Hilaria Guerrero PTA    Cognitive Assessment/Intervention    Current Cognitive/Communication Assessment functional  -RW functional  -CW     Orientation Status oriented x 4  -RW oriented x 4  -CW     Follows Commands/Answers Questions 100% of the time  -RW  100% of the time  -CW     Personal Safety WNL/WFL  -RW WNL/WFL  -CW     Personal Safety Interventions fall prevention program maintained;gait belt;nonskid shoes/slippers when out of bed  -RW fall prevention program maintained;gait belt;nonskid shoes/slippers when out of bed  -CW     Recorded by [RW] Priscilla Salmeron PTA [CW] Pavel Tellez     Bed Mobility, Assessment/Treatment    Bed Mobility, Assistive Device bed rails;head of bed elevated  -RW bed rails;head of bed elevated  -CW bed rails;head of bed elevated  -MM    Bed Mob, Supine to Sit, [place] verbal cues required;supervision required  -RW supervision required;verbal cues required;nonverbal cues required (demo/gesture)  -CW supervision required  -MM    Bed Mob, Sit to Supine, [place] not tested   Pt up in chair  -RW supervision required;verbal cues required;nonverbal cues required (demo/gesture)  -CW not tested   pt sat up in chair  -MM    Recorded by [RW] Priscilla Salmeron PTA [CW] Pavel Tellez [MM] Hilaria Guerrero PTA    Transfer Assessment/Treatment    Transfers, Sit-Stand [place] verbal cues required;nonverbal cues required (demo/gesture);minimum assist (75% patient effort)  -RW verbal cues required;nonverbal cues required (demo/gesture);contact guard assist  -CW verbal cues required;contact guard assist;1 person + 1 person to manage equipment  -MM    Transfers, Stand-Sit [place] verbal cues required;nonverbal cues required (demo/gesture);contact guard assist;minimum assist (75% patient effort)  -RW verbal cues required;nonverbal cues required (demo/gesture);contact guard assist  -CW verbal cues required;contact guard assist;1 person + 1 person to manage equipment  -MM    Transfers, Sit-Stand-Sit, Assist Device rolling walker  -RW rolling walker  -CW rolling walker  -MM    Toilet Transfer, [place] verbal cues required;nonverbal cues required (demo/gesture);moderate assist (50% patient effort)  -RW      Toilet Transfer,  Assistive Device rolling walker   L grab bar  -RW      Recorded by [RW] Priscilla Salmeron PTA [CW] Pavel Tellez [MM] Hilaria Guerrero PTA    Gait Assessment/Treatment    Gait, [place] Level verbal cues required;nonverbal cues required (demo/gesture);contact guard assist;minimum assist (75% patient effort)  -RW verbal cues required;nonverbal cues required (demo/gesture);contact guard assist  -CW verbal cues required;contact guard assist  -MM    Gait, Assistive Device rolling walker  -RW rolling walker  -CW rolling walker  -MM    Gait, Distance (Feet) 70  -RW 80  -CW 30  -MM    Gait, Gait Deviations forward flexed posture;bilateral:;[place] decreased;step length decreased;stride length decreased  -RW leda decreased;step length decreased;stride length decreased  -CW [place]a decreased;forward flexed posture;step length decreased  -MM    Recorded by [RW] Priscilla Salmeron PTA [CW] Pavel Tellez [MM] Hilaria Guerrero PTA    Positioning and Restraints    Pre-Treatment Position in bed  -RW in bed  -CW in bed  -MM    Post Treatment Position chair  -RW bed  -CW chair  -MM    In Bed  supine;call light within reach;encouraged to call for assist;with family/caregiver  -CW     In Chair sitting;call light within reach;encouraged to call for assist;exit alarm on  -RW  sitting;call light within reach;encouraged to call for assist;exit alarm on;RLE elevated;LLE elevated  -MM    Recorded by [RW] Priscilla Salmeron PTA [CW] Pavel Tellez [MM] Hilaria Guerrero PTA      User Key  (r) = Recorded By, (t) = Taken By, (c) = Cosigned By    Initials Name Effective Dates    MM Hilaria Guerrero PTA 02/18/16 -     RW Priscilla Salmeron, TRAM 04/06/16 -     CW Pavel Tellez 12/13/16 -                 IP PT Goals       01/11/17 1623          Bed Mobility PT LTG    Bed Mobility PT LTG, Date Established 01/11/17  -KH      Bed Mobility PT LTG, Time to Achieve 1 wk  -KH      Bed Mobility PT LTG, Activity Type all bed mobility  -KH       Bed Mobility PT LTG, [place] Level supervision required  -KH      Transfer Training PT LTG    Transfer Training PT LTG, Date Established 01/11/17  -KH      Transfer Training PT LTG, Time to Achieve 1 wk  -KH      Transfer Training PT LTG, Activity Type all transfers  -KH      Transfer Training PT LTG, [place] Level supervision required  -KH      Transfer Training PT LTG, Assist Device walker, rolling  -KH      Gait Training PT LTG    Gait Training Goal PT LTG, Date Established 01/11/17  -KH      Gait Training Goal PT LTG, Time to Achieve 1 wk  -KH      Gait Training Goal PT LTG, [place] Level supervision required  -KH      Gait Training Goal PT LTG, Assist Device walker, rolling  -KH      Gait Training Goal PT LTG, Distance to Achieve 150 ft  -KH      Patient Education PT LTG    Patient Education PT LTG, Date Established 01/11/17  -KH      Patient Education PT LTG, Time to Achieve 1 wk  -KH      Patient Education PT LTG, Education Type HEP  -KH      Patient Education PT LTG, Education Understanding demonstrate adequately  -KH        User Key  (r) = Recorded By, (t) = Taken By, (c) = Cosigned By    Initials Name Provider Type    DIMA Bowman, PT Physical Therapist          Physical Therapy Education     Title: PT OT SLP Therapies (Done)     Topic: Physical Therapy (Done)     Point: Mobility training (Done)    Learning Progress Summary    Learner Readiness Method Response Comment Documented by Status   Patient Acceptance ECODY D VU,NR  RW 01/16/17 0851 Done    Acceptance CODY LUGO VU  CW 01/15/17 1517 Done    Acceptance E AJIT  MM 01/14/17 1152 Done    Acceptance EANTONY  DM 01/13/17 0833 Done    Acceptance E CALI  JW 01/12/17 1651 Done    Acceptance E CALI  KH 01/11/17 1623 Done               Point: Home exercise program (Done)    Learning Progress Summary    Learner Readiness Method Response Comment Documented by Status   Patient Acceptance CODY LUGO VU  CW 01/15/17 1517 Done    Acceptance EANTONY   DM 01/13/17 0833 Done    Acceptance E VU  KH 01/11/17 1623 Done               Point: Body mechanics (Done)    Learning Progress Summary    Learner Readiness Method Response Comment Documented by Status   Patient Acceptance E,TB,D VU,NR  RW 01/16/17 0851 Done    Acceptance E,TB DU,VU  CW 01/15/17 1517 Done    Acceptance E,D VU  DM 01/13/17 0833 Done    Acceptance E VU  KH 01/11/17 1623 Done               Point: Precautions (Done)    Learning Progress Summary    Learner Readiness Method Response Comment Documented by Status   Patient Acceptance E,TB,D VU,NR  RW 01/16/17 0851 Done    Acceptance E,TB DU,VU  CW 01/15/17 1517 Done    Acceptance E,D VU  DM 01/13/17 0833 Done                      User Key     Initials Effective Dates Name Provider Type Discipline     05/18/15 -  Nae Bowman, PT Physical Therapist PT     05/18/15 -  Vic Dunn, PTA Physical Therapy Assistant PT    JW 02/18/16 -  Katherin Soni, PTA Physical Therapy Assistant PT    MM 02/18/16 -  Hilaria Guerrero, PTA Physical Therapy Assistant PT    RW 04/06/16 -  Priscilla Salmeron, PTA Physical Therapy Assistant PT     12/13/16 -  Pavel Tellez Physical Therapy Assistant PT                    PT Recommendation and Plan  Anticipated Discharge Disposition: extended care facility  Planned Therapy Interventions: balance training, bed mobility training, gait training, home exercise program, strengthening, transfer training, patient/family education  PT Frequency: daily  Plan of Care Review  Plan Of Care Reviewed With: patient  Progress: improving  Outcome Summary/Follow up Plan: Pt w/ pain and fatigue limiting. Increased fatigue after using restroom. Cues for safety and to stay close to RW.`          Outcome Measures       01/16/17 0800 01/15/17 1500 01/14/17 1100    How much help from another person do you currently need...    Turning from your back to your side while in flat bed without using bedrails? 4  -RW 4  -CW 4  -MM     Moving from lying on back to sitting on the side of a flat bed without bedrails? 4  -RW 4  -CW 4  -MM    Moving to and from a bed to a chair (including a wheelchair)? 3  -RW 3  -CW 3  -MM    Standing up from a chair using your arms (e.g., wheelchair, bedside chair)? 3  -RW 3  -CW 3  -MM    Climbing 3-5 steps with a railing? 2  -RW 2  -CW 2  -MM    To walk in hospital room? 3  -RW 3  -CW 3  -MM    AM-PAC 6 Clicks Score 19  -RW 19  -CW 19  -MM    Functional Assessment    Outcome Measure Options AM-PAC 6 Clicks Basic Mobility (PT)  -RW AM-PAC 6 Clicks Basic Mobility (PT)  -CW       User Key  (r) = Recorded By, (t) = Taken By, (c) = Cosigned By    Initials Name Provider Type     Hilaria Guerrero PTA Physical Therapy Assistant    DIMAS Salmeron PTA Physical Therapy Assistant    DARIO Tellez Physical Therapy Assistant           Time Calculation:         PT Charges       01/16/17 0849          Time Calculation    Start Time 0825  -RW      Stop Time 0849  -      Time Calculation (min) 24 min  -      PT Received On 01/16/17  -      PT - Next Appointment 01/17/17  -        User Key  (r) = Recorded By, (t) = Taken By, (c) = Cosigned By    Initials Name Provider Type     Priscilla Salmeron PTA Physical Therapy Assistant          Therapy Charges for Today     Code Description Service Date Service Provider Modifiers Qty    34229677981 HC PT THER PROC EA 15 MIN 1/16/2017 Priscilla Salmeron PTA GP 2          PT G-Codes  Outcome Measure Options: AM-PAC 6 Clicks Basic Mobility (PT)    Priscilla Salmeron PTA  1/16/2017

## 2017-01-16 NOTE — PLAN OF CARE
Problem: Patient Care Overview (Adult)  Goal: Plan of Care Review  Outcome: Ongoing (interventions implemented as appropriate)    01/16/17 8501   Coping/Psychosocial Response Interventions   Plan Of Care Reviewed With patient   Patient Care Overview   Progress improving   Outcome Evaluation   Outcome Summary/Follow up Plan pt dressing changed today. legs look better according to wound care nurse. ace wraps unwrapped and rewrapped. pt alert and oriented. up with assist and walker . scheduled pain medication given . pt refused prn pain medication. pt currently resting in bed.        Goal: Adult Individualization and Mutuality  Outcome: Ongoing (interventions implemented as appropriate)  Goal: Discharge Needs Assessment  Outcome: Ongoing (interventions implemented as appropriate)    Problem: Fall Risk (Adult)  Goal: Absence of Falls  Outcome: Ongoing (interventions implemented as appropriate)    Problem: Skin Integrity Impairment, Risk/Actual (Adult)  Goal: Skin Integrity/Wound Healing  Outcome: Ongoing (interventions implemented as appropriate)    Problem: Pain, Acute (Adult)  Goal: Acceptable Pain Control/Comfort Level  Outcome: Ongoing (interventions implemented as appropriate)

## 2017-01-16 NOTE — PROGRESS NOTES
LOS: 6 days     Name: Juliana Gardner  Age/Sex: 89 y.o. female  :  1918        PCP: Wanda Noel MD    Subjective   She continues to improve daily no new complaints today  General: No Fever or Chills, Cardiac: No Chest Pain or Palpitations, Resp: No Cough or SOA, GI: No Nausea, Vomiting, or Diarrhea and Other: No bleeding      aspirin 81 mg Oral Daily   baclofen 5 mg Oral Nightly   ceFAZolin 1 g Intravenous Q12H   cilostazol 100 mg Oral BID   cycloSPORINE 1 drop Both Eyes Q12H   docusate sodium 250 mg Oral Nightly   enoxaparin 30 mg Subcutaneous Q24H   famotidine 20 mg Oral Daily   gabapentin 300 mg Oral BID   HYDROcodone-acetaminophen 0.5 tablet Oral Q6H   latanoprost 1 drop Both Eyes Nightly   losartan 50 mg Oral Daily   polyethylene glycol 17 g Oral BID   pramipexole 0.25 mg Oral Nightly   senna 2 tablet Oral Nightly   traMADol 50 mg Oral Nightly          Objective   Vital Signs  Temp:  [97.9 °F (36.6 °C)-99 °F (37.2 °C)] 97.9 °F (36.6 °C)  Heart Rate:  [65-81] 65  Resp:  [18-20] 20  BP: (113-131)/(57-63) 117/58  Body mass index is 20.97 kg/(m^2).  No intake or output data in the 24 hours ending 17 0721    General:  Resting comfortably no active distress  Eyes:  PERRL; no conj pallor; EOMI; no scleral icterus  ENT:  oral mucosa moist; pharynx w/o exudate; ext inspect WNL  Neck: nl movement; no JVD; no adenopathy; thyroid WNL  Lungs:  CTA; good air entry; Non labored; No wheeze, No Crackle, No Rhonchi  Cardiac:  RRR 3/6murmur;  nl S1/S2  Abd:  soft; non tender; non distended; no HSM; no masses; BS +  : deferred  Musc/Skel: no arthropathy,bilateral lower extremities appear improved dressing applied  Skin:  warm and perfused; no apparent rash on examined areas  Neuro: CN grossly intact; no focal deficit of strength or sensory   Ext/P Vasc:  peripheral pulses 2+; no clubbing ; no cyanosis; no edema  MS & Psychiatric: A&O x  3; memory intact; affect/mood appropriate    Results Review:       I  reviewed the patient's new clinical results.    Results from last 7 days  Lab Units 01/16/17  0453 01/15/17  0652 01/14/17  0722 01/13/17  0649 01/12/17  0730 01/11/17  0717 01/10/17  1232   WBC 10*3/mm3 4.49* 4.67 4.54 4.63 4.40* 4.93 6.34   HEMOGLOBIN g/dL 9.8* 10.2* 9.8* 10.4* 10.1* 9.4* 10.4*   PLATELETS 10*3/mm3 270 361 290 298 299 266 318       Results from last 7 days  Lab Units 01/16/17  0453 01/15/17  0652 01/14/17  0722 01/13/17  0649 01/12/17  0730 01/11/17  0717 01/10/17  1232   SODIUM mmol/L 141 140 140 142 141 139 140   POTASSIUM mmol/L 4.3 4.3 4.1 4.4 4.3 4.3 4.5   CHLORIDE mmol/L 105 104 103 104 105 105 102   TOTAL CO2 mmol/L 25.0 24.6 25.5 27.4 25.9 24.8 26.3   BUN mg/dL 8 15 16 16 17 17 16   CREATININE mg/dL 0.83 0.93 0.88 0.95 0.94 1.06* 1.16*   CALCIUM mg/dL 8.6 8.7 8.6 8.7 8.6 8.5 9.1   MAGNESIUM mg/dL 2.2 2.2 2.2 2.2 2.2 2.1 2.3   PHOSPHORUS mg/dL 3.2 3.4 3.3 3.3 3.4 3.4 3.6   Estimated Creatinine Clearance: 35.2 mL/min (by C-G formula based on Cr of 0.83).      Assessment/Plan   Active Problems:    Cellulitis of lower extremity    Venous stasis dermatitis of both lower extremities    Open leg wound    DNR (do not resuscitate)    Heart murmur    Cellulitis    Venous insufficiency of both lower extremities      PLAN  1. Bilateral lower extremity cellulitis: Continues to improve on IV Cefizolin if remains afebrile consider change to oral antibiotics, continue IV antibiotics for now while inpatient  D6/14  2. Venous stasis dermatitis bilateral lower extremities:  Continue wound care  3. Venous stasis ulcerations:  Wound care is evaluating appreciated vascular's input.  Continue wraps manage edema  4. Heart murmur:Chronic issue no acute workup indicated  5. chronic kidney disease: Stable    Disposition  Dr. Albrecht will evaluate today hopefully ready for discharge early this week      Ash Chase MD  [facility]  01/16/17  7:21 AM      EMR Dragon/Transcription  disclaimer:     Much of this encounter note is an electronic transcription/translation of spoken language to printed text. The electronic translation of spoken language may permit erroneous, or at times, nonsensical words or phrases to be inadvertently transcribed; Although I have reviewed the note for such errors, some may still exist.

## 2017-01-17 VITALS
HEIGHT: 66 IN | OXYGEN SATURATION: 92 % | WEIGHT: 129.9 LBS | BODY MASS INDEX: 20.88 KG/M2 | RESPIRATION RATE: 20 BRPM | HEART RATE: 91 BPM | TEMPERATURE: 98.3 F | DIASTOLIC BLOOD PRESSURE: 60 MMHG | SYSTOLIC BLOOD PRESSURE: 125 MMHG

## 2017-01-17 PROCEDURE — 97110 THERAPEUTIC EXERCISES: CPT

## 2017-01-17 PROCEDURE — 25010000003 CEFAZOLIN PER 500 MG: Performed by: INTERNAL MEDICINE

## 2017-01-17 PROCEDURE — 25010000002 ENOXAPARIN PER 10 MG: Performed by: HOSPITALIST

## 2017-01-17 RX ORDER — TRAMADOL HYDROCHLORIDE 50 MG/1
50 TABLET ORAL NIGHTLY
Qty: 5 TABLET | Refills: 0 | Status: SHIPPED | OUTPATIENT
Start: 2017-01-17

## 2017-01-17 RX ORDER — HYDROCODONE BITARTRATE AND ACETAMINOPHEN 5; 325 MG/1; MG/1
0.5 TABLET ORAL EVERY 4 HOURS PRN
Qty: 10 TABLET | Refills: 0 | Status: SHIPPED | OUTPATIENT
Start: 2017-01-17

## 2017-01-17 RX ORDER — CEPHALEXIN 500 MG/1
500 CAPSULE ORAL 2 TIMES DAILY
Qty: 14 CAPSULE | Refills: 0 | Status: SHIPPED | OUTPATIENT
Start: 2017-01-17

## 2017-01-17 RX ADMIN — LOSARTAN POTASSIUM 50 MG: 50 TABLET, FILM COATED ORAL at 09:31

## 2017-01-17 RX ADMIN — HYDROCODONE BITARTRATE AND ACETAMINOPHEN 0.5 TABLET: 5; 325 TABLET ORAL at 12:49

## 2017-01-17 RX ADMIN — CYCLOSPORINE 1 DROP: 0.5 EMULSION OPHTHALMIC at 09:34

## 2017-01-17 RX ADMIN — CEFAZOLIN SODIUM 1 G: 1 INJECTION, SOLUTION INTRAVENOUS at 09:30

## 2017-01-17 RX ADMIN — GABAPENTIN 300 MG: 300 CAPSULE ORAL at 09:31

## 2017-01-17 RX ADMIN — FAMOTIDINE 20 MG: 20 TABLET, FILM COATED ORAL at 09:30

## 2017-01-17 RX ADMIN — ENOXAPARIN SODIUM 30 MG: 30 INJECTION SUBCUTANEOUS at 09:30

## 2017-01-17 RX ADMIN — POLYETHYLENE GLYCOL 3350 17 G: 17 POWDER, FOR SOLUTION ORAL at 09:30

## 2017-01-17 RX ADMIN — CILOSTAZOL 100 MG: 100 TABLET ORAL at 09:31

## 2017-01-17 RX ADMIN — ASPIRIN 81 MG: 81 TABLET, CHEWABLE ORAL at 09:31

## 2017-01-17 RX ADMIN — HYDROCODONE BITARTRATE AND ACETAMINOPHEN 0.5 TABLET: 5; 325 TABLET ORAL at 06:04

## 2017-01-17 NOTE — PROGRESS NOTES
Continued Stay Note  [facility]     Patient Name: Juliana Gardner  MRN: [id number]  Today's Date: 1/17/2017    Admit Date: 1/10/2017          Discharge Plan       01/17/17 1421    Case Management/Social Work Plan    Plan [facility]    Patient/Family In Agreement With Plan yes    Additional Comments IMM letter signed today. Spoke with Sister at Bedside. Plan remains to return to [facility]. D/C orders noted. Per Sister OK to call  [facility].  [facility] Pt back to [facility]. Packet updated and given to nursing.     Final Note    Final Note [facility]              Discharge Codes       01/17/17 1424    Discharge Codes    Discharge Codes 06  Discharged/transferred to home under care of organized home health service organization in anticipation of skilled care        Expected Discharge Date and Time     Expected Discharge Date Expected Discharge Time    Jan 17, 2017             Valerie Fitzgerald RN

## 2017-01-17 NOTE — PROGRESS NOTES
Bo Albrecht MD       LOS: 7 days   Patient Care Team:  Wanda Noel MD as PCP - General (Internal Medicine)    Subjective     89 y.o. female venous shortness of breath overnight.  Patient tolerating compression without issue.    Review of Systems    Objective     Vital Signs  Temp:  [97.7 °F (36.5 °C)-98.6 °F (37 °C)] 98.3 °F (36.8 °C)  Heart Rate:  [72-91] 91  Resp:  [18-20] 20  BP: (125-143)/(60-69) 125/60    Physical Exam:  Physical Exam   Constitutional: She is oriented to person, place, and time. She appears well-developed and well-nourished.   Eyes: Pupils are equal, round, and reactive to light. No scleral icterus.   Neck: Normal range of motion. No JVD present.   Pulmonary/Chest: Effort normal. No respiratory distress.   Abdominal: Soft. She exhibits no distension.   Musculoskeletal: She exhibits edema.   Neurological: She is alert and oriented to person, place, and time.   Skin: There is erythema. No pallor.   Psychiatric: She has a normal mood and affect. Her behavior is normal.       Results Review:       No results found for this or any previous visit (from the past 12 hour(s)).]      Assessment/Plan     Active Problems:    Cellulitis of lower extremity    Venous stasis dermatitis of both lower extremities    Open leg wound    DNR (do not resuscitate)    Heart murmur    Cellulitis    Venous insufficiency of both lower extremities         89 y.o. female venous stasis ulcers with superimposed cellulitis. The cellulitis is markedly improving.   Patient tolerating compression well.  Continue with current dressing changes. She is improving and could be managed at the [place].  I believe she is stable for discharge when [facility] feels she is medically appropriate.  She'll need continued ulcer care at the nursing facility at the [place].  She will need to keep her legs elevated when she is not actively walking.        Elian Albrecht MD  01/17/17  3:01 PM    Please call my office with any question:  [phone]

## 2017-01-17 NOTE — PLAN OF CARE
Problem: Patient Care Overview (Adult)  Goal: Plan of Care Review  Outcome: Ongoing (interventions implemented as appropriate)    01/17/17 0908   Coping/Psychosocial Response Interventions   Plan Of Care Reviewed With patient   Outcome Evaluation   Outcome Summary/Follow up Plan Pt increased ambulation distance slightly. Less pain today.

## 2017-01-17 NOTE — PLAN OF CARE
Problem: Patient Care Overview (Adult)  Goal: Plan of Care Review  Outcome: Ongoing (interventions implemented as appropriate)    01/17/17 0315   Coping/Psychosocial Response Interventions   Plan Of Care Reviewed With patient   Patient Care Overview   Progress improving   Outcome Evaluation   Outcome Summary/Follow up Plan pt resting in bed quietly. medicated prn for pain. ace wraps rewrapped. dressing remains dry and intact       Goal: Adult Individualization and Mutuality  Outcome: Ongoing (interventions implemented as appropriate)  Goal: Discharge Needs Assessment  Outcome: Ongoing (interventions implemented as appropriate)    Problem: Fall Risk (Adult)  Goal: Absence of Falls  Outcome: Ongoing (interventions implemented as appropriate)    Problem: Skin Integrity Impairment, Risk/Actual (Adult)  Goal: Skin Integrity/Wound Healing  Outcome: Ongoing (interventions implemented as appropriate)    Problem: Pain, Acute (Adult)  Goal: Acceptable Pain Control/Comfort Level  Outcome: Ongoing (interventions implemented as appropriate)

## 2017-01-17 NOTE — DISCHARGE SUMMARY
Date of Admission: 1/10/2017  Date of Discharge:  1/17/2017    PCP: Wanda Noel MD      DISCHARGE DIAGNOSIS  Active Problems:    Cellulitis of lower extremity    Venous stasis dermatitis of both lower extremities    Open leg wound    DNR (do not resuscitate)    Heart murmur    Cellulitis    Venous insufficiency of both lower extremities      SECONDARY DIAGNOSES  Past Medical History   Diagnosis Date   • Cellulitis of lower extremity 1/10/2017   • Gastritis    • GERD (gastroesophageal reflux disease)    • Hearing loss    • Heart murmur    • Hypertension    • Macular degeneration    • OA (osteoarthritis)    • Venous stasis dermatitis of both lower extremities 1/10/2017   • Vitamin D deficiency        CONSULTS   Consults     Date and Time Order Name Status Description    1/10/2017 1224 Inpatient Consult to Vascular Surgery Completed           PROCEDURES PERFORMED      HOSPITAL COURSE  Patient is a 89 y.o. female presented to [facility] complaining of lower extremity redness and wounds.  Please see the admitting history and physical for further details.  She is diagnosed with cellulitis and open wounds on bilateral legs on admission.  She was found to have significant venous insufficiency and lower extremity edema.  Vascular surgery was consult and they evaluated and managed wounds and edema over the course of her stay.  She required local wound care and twice a day dressing changes as well as compression dressings to improve edema.  An order for these wounds to heal she'll need adequate control of the edema.  It's recommended to continue these dressings in the outpatient setting.  She's completed 7 days of IV antibiotics and will need 7 more days of oral Keflex discharge.  Otherwise on the day of discharge her legs are improved saline this is nearly resolved and she is agreeable for discharge today.      CONDITION ON DISCHARGE  Stable.      VITAL SIGNS  Visit Vitals   • /62 (BP  "Location: Right arm, Patient Position: Sitting)   • Pulse 72   • Temp 98.2 °F (36.8 °C) (Oral)   • Resp 20   • Ht 66\" (167.6 cm)   • Wt 129 lb 14.4 oz (58.9 kg)   • SpO2 94%   • BMI 20.97 kg/m2     Objective:  General Appearance:  Comfortable.    Vital signs: (most recent): Blood pressure 127/62, pulse 72, temperature 98.2 °F (36.8 °C), temperature source Oral, resp. rate 20, height 66\" (167.6 cm), weight 129 lb 14.4 oz (58.9 kg), SpO2 94 %.  Vital signs are normal.    Output: Producing urine.    HEENT: Normal HEENT exam.    Lungs:  Normal effort.  Breath sounds clear to auscultation.    Heart: Normal rate.  S1 normal.    Abdomen: Abdomen is soft.  Bowel sounds are normal.     Extremities: Normal range of motion.    Pulses: Distal pulses are intact.    Neurological: Patient is alert and oriented to person, place and time.                DISCHARGE DISPOSITION   Home or Self Care      DISCHARGE MEDICATIONS   Juliana Gardner   Home Medication Instructions ALAN:[id number]    Printed on:01/17/17 2706   Medication Information                      acetaminophen (TYLENOL) 500 MG tablet  Take 500 mg by mouth Every 4 (Four) Hours As Needed for mild pain (1-3) or fever.             aspirin 81 MG chewable tablet  Chew 81 mg Daily.             b complex-C-folic acid 1 MG capsule  Take 1 capsule by mouth Daily.             baclofen (LIORESAL) 10 MG tablet  Take 5 mg by mouth Every Night.             cephalexin (KEFLEX) 500 MG capsule  Take 1 capsule by mouth 2 (Two) Times a Day.             cilostazol (PLETAL) 100 MG tablet  Take 100 mg by mouth 2 (Two) Times a Day.             cycloSPORINE (RESTASIS) 0.05 % ophthalmic emulsion  Administer 1 drop to both eyes Every 12 (Twelve) Hours.             docusate sodium (COLACE) 250 MG capsule  Take 250 mg by mouth Every Night.             gabapentin (NEURONTIN) 300 MG capsule  Take 300 mg by mouth 2 (Two) Times a Day.             HYDROcodone-acetaminophen (NORCO) 5-325 MG per tablet  Take " 0.5 tablets by mouth Every 6 (Six) Hours.             lactulose (CHRONULAC) 10 GM/15ML solution  Take 30 mL by mouth 2 (Two) Times a Day As Needed (bowel management).             latanoprost (XALATAN) 0.005 % ophthalmic solution  Administer 1 drop to both eyes Every Night.             losartan (COZAAR) 50 MG tablet  Take 100 mg by mouth Daily. Hold for systolic blood pressure <110             magnesium citrate 1.745 GM/30ML solution solution  Take 150 mL by mouth As Needed (bowel management).             mineral oil 55 % oil oral liquid  Take 15 mL by mouth Daily.             polyethylene glycol (MIRALAX) packet  Take 17 g by mouth 2 (Two) Times a Day.             pramipexole (MIRAPEX) 0.25 MG tablet  Take 0.25 mg by mouth Every Night.             promethazine (PHENERGAN) 25 MG tablet  Take 25 mg by mouth Every 6 (Six) Hours As Needed for nausea or vomiting.             raNITIdine (ZANTAC) 150 MG tablet  Take 150 mg by mouth Daily.             senna (SENOKOT) 8.6 MG tablet tablet  Take 2 tablets by mouth Every Night.             traMADol (ULTRAM) 50 MG tablet  Take 50 mg by mouth Every Night.               Diet Instructions     Diet: Regular; Thin Liquids, No Restrictions       Discharge Diet:  Regular   Fluid Consistency:  Thin Liquids, No Restrictions                Activity Instructions     Activity as Tolerated                 No future appointments.  Referrals and Follow-ups to Schedule     Follow-Up    As directed    Follow up with Dr Albrecht as directed   Follow Up Details:  as directed             Follow-up Information     Follow up with [facility] .    Specialty:  Assisted Living Facility    Contact information:    [address] [facility] [address] [phone]        Follow up with Wanda Noel MD .    Specialty:  Internal Medicine    Contact information:    [address] [facility] [address] [phone]            TEST  RESULTS PENDING AT DISCHARGE         Ash BOOTH  MD Salvatore  [facility]  01/17/17  1:52 PM      Time: greater than 30 minutes.      Dragon disclaimer:  Much of this encounter note is an electronic transcription/translation of spoken language to printed text. The electronic translation of spoken language may permit erroneous, or at times, nonsensical words or phrases to be inadvertently transcribed; Although I have reviewed the note for such errors, some may still exist.

## 2017-01-17 NOTE — PROGRESS NOTES
Acute Care - Physical Therapy Treatment Note  [facility]     Patient Name: Juliana Gardner  : 1918  MRN: [id number]  Today's Date: 2017             Admit Date: 1/10/2017    Visit Dx:    ICD-10-CM ICD-9-CM   1. Difficulty walking R26.2 719.7     Patient Active Problem List   Diagnosis   • Cellulitis of lower extremity   • Venous stasis dermatitis of both lower extremities   • Open leg wound   • DNR (do not resuscitate)   • Heart murmur   • Cellulitis   • Venous insufficiency of both lower extremities               Adult Rehabilitation Note       17 0800 17 0800 01/15/17 1500    Rehab Assessment/Intervention    Discipline physical therapy assistant  -RW physical therapy assistant  -RW physical therapy assistant  -CW    Document Type therapy note (daily note)  -RW therapy note (daily note)  -RW therapy note (daily note)  -CW    Subjective Information agree to therapy;no complaints  -RW agree to therapy;complains of;pain  -RW agree to therapy;complains of;pain  -CW    Patient Effort, Rehab Treatment good  -RW good  -RW good  -CW    Precautions/Limitations fall precautions  -RW fall precautions  -RW fall precautions  -CW    Recorded by [RW] Priscilla Salmeron PTA [RW] Priscilla Salmeron PTA [CW] Pavel Tellez    Pain Assessment    Pain Assessment 0-10  -RW 0-10  -RW 0-10  -CW    Pain Score 5  -RW 7  -RW 7   When amb with RWX there is pain in B LE  -CW    Pain Type Acute pain  -RW Acute pain  -RW     Pain Location Leg  -RW Leg  -RW     Pain Orientation Right;Left  -RW Right;Left  -RW     Pain Intervention(s) Medication (See MAR);Repositioned;Ambulation/increased activity  -RW Medication (See MAR);Repositioned;Ambulation/increased activity  -RW     Response to Interventions tolerated  -RW tolerated  -RW     Recorded by [RW] Priscilla Salmeron PTA [RW] Priscilla Salmeron, TRAM [CW] Pavel Tellez    Cognitive Assessment/Intervention    Current Cognitive/Communication Assessment functional  -RW  functional  -RW functional  -CW    Orientation Status oriented x 4  -RW oriented x 4  -RW oriented x 4  -CW    Follows Commands/Answers Questions 100% of the time  -% of the time  -% of the time  -CW    Personal Safety WNL/WFL  -RW WNL/WFL  -RW WNL/WFL  -CW    Personal Safety Interventions fall prevention program maintained;gait belt;nonskid shoes/slippers when out of bed  -RW fall prevention program maintained;gait belt;nonskid shoes/slippers when out of bed  -RW fall prevention program maintained;gait belt;nonskid shoes/slippers when out of bed  -CW    Recorded by [RW] Priscilla Salmeron PTA [RW] Priscilla Salmeron PTA [CW] Pavel Tellez    Bed Mobility, Assessment/Treatment    Bed Mobility, Assistive Device  bed rails;head of bed elevated  -RW bed rails;head of bed elevated  -CW    Bed Mob, Supine to Sit, [place] not tested   Pt up in chair  -RW verbal cues required;supervision required  -RW supervision required;verbal cues required;nonverbal cues required (demo/gesture)  -CW    Bed Mob, Sit to Supine, [place] verbal cues required;minimum assist (75% patient effort)   assist w/ BLE  -RW not tested   Pt up in chair  -RW supervision required;verbal cues required;nonverbal cues required (demo/gesture)  -CW    Recorded by [RW] Priscilla Salmeron PTA [RW] Priscilla Salmeron PTA [CW] Pavel Tellez    Transfer Assessment/Treatment    Transfers, Sit-Stand [place] verbal cues required;nonverbal cues required (demo/gesture);minimum assist (75% patient effort)  -RW verbal cues required;nonverbal cues required (demo/gesture);minimum assist (75% patient effort)  -RW verbal cues required;nonverbal cues required (demo/gesture);contact guard assist  -CW    Transfers, Stand-Sit [place] verbal cues required;nonverbal cues required (demo/gesture);contact guard assist  -RW verbal cues required;nonverbal cues required (demo/gesture);contact guard assist;minimum assist (75% patient effort)  -RW verbal  cues required;nonverbal cues required (demo/gesture);contact guard assist  -CW    Transfers, Sit-Stand-Sit, Assist Device rolling walker  -RW rolling walker  -RW rolling walker  -CW    Toilet Transfer, [place]  verbal cues required;nonverbal cues required (demo/gesture);moderate assist (50% patient effort)  -RW     Toilet Transfer, Assistive Device  rolling walker   L grab bar  -RW     Transfer, Safety Issues loses balance backward  -RW      Recorded by [RW] Priscilla Salmeron PTA [RW] Priscilla Salmeron PTA [CW] Pavel Tellez    Gait Assessment/Treatment    Gait, [place] Level verbal cues required;nonverbal cues required (demo/gesture);contact guard assist;minimum assist (75% patient effort)  -RW verbal cues required;nonverbal cues required (demo/gesture);contact guard assist;minimum assist (75% patient effort)  -RW verbal cues required;nonverbal cues required (demo/gesture);contact guard assist  -CW    Gait, Assistive Device rolling walker  -RW rolling walker  -RW rolling walker  -CW    Gait, Distance (Feet) 75  -RW 70  -RW 80  -CW    Gait, Gait Deviations forward flexed posture;bilateral:;[place] decreased;step length decreased;stride length decreased  -RW forward flexed posture;bilateral:;[place] decreased;step length decreased;stride length decreased  -RW [place] decreased;step length decreased;stride length decreased  -CW    Recorded by [RW] Priscilla Salmeron PTA [RW] Priscilla Salmeron PTA [CW] Pavel Tellez    Positioning and Restraints    Pre-Treatment Position sitting in chair/recliner  -RW in bed  -RW in bed  -CW    Post Treatment Position bed  -RW chair  -RW bed  -CW    In Bed supine;call light within reach;encouraged to call for assist;exit alarm on  -RW  supine;call light within reach;encouraged to call for assist;with family/caregiver  -CW    In Chair  sitting;call light within reach;encouraged to call for assist;exit alarm on  -RW     Recorded by [RW] Priscilla Salmeron PTA [RW] Priscilla NAZARIO  TRAM Salmeron [CW] Pavel P Geoff      01/14/17 1150          Rehab Assessment/Intervention    Discipline physical therapy assistant  -MM      Document Type therapy note (daily note)  -MM      Subjective Information agree to therapy  -MM      Patient Effort, Rehab Treatment good  -MM      Precautions/Limitations fall precautions  -MM      Recorded by [MM] Hilaria Guerrero PTA      Pain Assessment    Pain Assessment No/denies pain  -MM      Recorded by [MM] Hilaria Guerrero PTA      Bed Mobility, Assessment/Treatment    Bed Mobility, Assistive Device bed rails;head of bed elevated  -MM      Bed Mob, Supine to Sit, [place] supervision required  -MM      Bed Mob, Sit to Supine, [place] not tested   pt sat up in chair  -MM      Recorded by [MM] Hilaria Guerrero PTA      Transfer Assessment/Treatment    Transfers, Sit-Stand [place] verbal cues required;contact guard assist;1 person + 1 person to manage equipment  -MM      Transfers, Stand-Sit [place] verbal cues required;contact guard assist;1 person + 1 person to manage equipment  -MM      Transfers, Sit-Stand-Sit, Assist Device rolling walker  -MM      Recorded by [MM] Hilaria Guerrero PTA      Gait Assessment/Treatment    Gait, [place] Level verbal cues required;contact guard assist  -MM      Gait, Assistive Device rolling walker  -MM      Gait, Distance (Feet) 30  -MM      Gait, Gait Deviations [place] decreased;forward flexed posture;step length decreased  -MM      Recorded by [MM] Hilaria Guerrero PTA      Positioning and Restraints    Pre-Treatment Position in bed  -MM      Post Treatment Position chair  -MM      In Chair sitting;call light within reach;encouraged to call for assist;exit alarm on;RLE elevated;LLE elevated  -MM      Recorded by [MM] Hilaria Guerrero PTA        User Key  (r) = Recorded By, (t) = Taken By, (c) = Cosigned By    Initials Name Effective Dates    MM Hilaria Guerrero PTA 02/18/16 -     RW Priscilla Salmeron PTA  04/06/16 -     CW [place] Tellez 12/13/16 -                 IP PT Goals       01/11/17 1623          Bed Mobility PT LTG    Bed Mobility PT LTG, Date Established 01/11/17  -KH      Bed Mobility PT LTG, Time to Achieve 1 wk  -KH      Bed Mobility PT LTG, Activity Type all bed mobility  -KH      Bed Mobility PT LTG, [place] Level supervision required  -KH      Transfer Training PT LTG    Transfer Training PT LTG, Date Established 01/11/17  -KH      Transfer Training PT LTG, Time to Achieve 1 wk  -KH      Transfer Training PT LTG, Activity Type all transfers  -KH      Transfer Training PT LTG, [place] Level supervision required  -KH      Transfer Training PT LTG, Assist Device walker, rolling  -KH      Gait Training PT LTG    Gait Training Goal PT LTG, Date Established 01/11/17  -KH      Gait Training Goal PT LTG, Time to Achieve 1 wk  -KH      Gait Training Goal PT LTG, [place] Level supervision required  -KH      Gait Training Goal PT LTG, Assist Device walker, rolling  -KH      Gait Training Goal PT LTG, Distance to Achieve 150 ft  -KH      Patient Education PT LTG    Patient Education PT LTG, Date Established 01/11/17  -KH      Patient Education PT LTG, Time to Achieve 1 wk  -KH      Patient Education PT LTG, Education Type HEP  -KH      Patient Education PT LTG, Education Understanding demonstrate adequately  -KH        User Key  (r) = Recorded By, (t) = Taken By, (c) = Cosigned By    Initials Name Provider Type    DIMA Bowman, PT Physical Therapist          Physical Therapy Education     Title: PT OT SLP Therapies (Done)     Topic: Physical Therapy (Done)     Point: Mobility training (Done)    Learning Progress Summary    Learner Readiness Method Response Comment Documented by Status   Patient Acceptance CODY LUGO D VUNR  RW 01/17/17 0907 Done    Acceptance CODY LUGO D VUNR  RW 01/16/17 0851 Done    Acceptance CODY LUGO VU CW 01/15/17 1517 Done    Acceptance PARISH FONG  MM 01/14/17 1152 Done     Acceptance E,D VU  DM 01/13/17 0833 Done    Acceptance E VU  JW 01/12/17 1651 Done    Acceptance E VU  KH 01/11/17 1623 Done               Point: Home exercise program (Done)    Learning Progress Summary    Learner Readiness Method Response Comment Documented by Status   Patient Acceptance E,TB DU,VU  CW 01/15/17 1517 Done    Acceptance E,D VU  DM 01/13/17 0833 Done    Acceptance E VU  KH 01/11/17 1623 Done               Point: Body mechanics (Done)    Learning Progress Summary    Learner Readiness Method Response Comment Documented by Status   Patient Acceptance E,TB,D VU,NR   01/17/17 0907 Done    Acceptance E,TB,D VU,NR  RW 01/16/17 0851 Done    Acceptance E,TB DU,VU  CW 01/15/17 1517 Done    Acceptance E,D VU  DM 01/13/17 0833 Done    Acceptance E VU  KH 01/11/17 1623 Done               Point: Precautions (Done)    Learning Progress Summary    Learner Readiness Method Response Comment Documented by Status   Patient Acceptance E,TB,D VU,NR  RW 01/17/17 0907 Done    Acceptance E,TB,D VU,NR  RW 01/16/17 0851 Done    Acceptance E,TB DU,VU  CW 01/15/17 1517 Done    Acceptance E,D VU  DM 01/13/17 0833 Done                      User Key     Initials Effective Dates Name Provider Type Discipline     05/18/15 -  Nae Bowman, PT Physical Therapist PT     05/18/15 -  Vic Dunn, PTA Physical Therapy Assistant PT     02/18/16 -  Katherin Soni, PTA Physical Therapy Assistant PT     02/18/16 -  Hilaria Guerrero, PTA Physical Therapy Assistant PT     04/06/16 -  Priscilla Salmeron, PTA Physical Therapy Assistant PT     12/13/16 -  Pavel Tellez Physical Therapy Assistant PT                    PT Recommendation and Plan  Anticipated Discharge Disposition: extended care facility  Planned Therapy Interventions: balance training, bed mobility training, gait training, home exercise program, strengthening, transfer training, patient/family education  PT Frequency: daily  Plan of Care Review  Plan  Of Care Reviewed With: patient  Progress: improving  Outcome Summary/Follow up Plan: Pt increased ambulation distance slightly. Less pain today.          Outcome Measures       01/17/17 0900 01/16/17 0800 01/15/17 1500    How much help from another person do you currently need...    Turning from your back to your side while in flat bed without using bedrails? 4  -RW 4  -RW 4  -CW    Moving from lying on back to sitting on the side of a flat bed without bedrails? 3  -RW 4  -RW 4  -CW    Moving to and from a bed to a chair (including a wheelchair)? 3  -RW 3  -RW 3  -CW    Standing up from a chair using your arms (e.g., wheelchair, bedside chair)? 3  -RW 3  -RW 3  -CW    Climbing 3-5 steps with a railing? 2  -RW 2  -RW 2  -CW    To walk in hospital room? 3  -RW 3  -RW 3  -CW    AM-PAC 6 Clicks Score 18  -RW 19  -RW 19  -CW    Functional Assessment    Outcome Measure Options AM-PAC 6 Clicks Basic Mobility (PT)  -RW AM-PAC 6 Clicks Basic Mobility (PT)  -RW AM-PAC 6 Clicks Basic Mobility (PT)  -CW      01/14/17 1100          How much help from another person do you currently need...    Turning from your back to your side while in flat bed without using bedrails? 4  -MM      Moving from lying on back to sitting on the side of a flat bed without bedrails? 4  -MM      Moving to and from a bed to a chair (including a wheelchair)? 3  -MM      Standing up from a chair using your arms (e.g., wheelchair, bedside chair)? 3  -MM      Climbing 3-5 steps with a railing? 2  -MM      To walk in hospital room? 3  -MM      AM-PAC 6 Clicks Score 19  -MM        User Key  (r) = Recorded By, (t) = Taken By, (c) = Cosigned By    Initials Name Provider Type    NOEMI Guerrero, TRAM Physical Therapy Assistant    DIMAS Salmeron PTA Physical Therapy Assistant    DARIO Tellez Physical Therapy Assistant           Time Calculation:         PT Charges       01/17/17 0849          Time Calculation    Start Time 0847  -RW      Stop Time  0904  -      Time Calculation (min) 17 min  -      PT Received On 01/17/17  -RW      PT - Next Appointment 01/18/17  -        User Key  (r) = Recorded By, (t) = Taken By, (c) = Cosigned By    Initials Name Provider Type     Priscilla Salmeron PTA Physical Therapy Assistant          Therapy Charges for Today     Code Description Service Date Service Provider Modifiers Qty    18873423917 HC PT THER PROC EA 15 MIN 1/16/2017 Priscilla Salmeron PTA GP 2    71995508322 HC PT THER PROC EA 15 MIN 1/17/2017 Priscilla Salmeron PTA GP 1          PT G-Codes  Outcome Measure Options: AM-PAC 6 Clicks Basic Mobility (PT)    Priscilla Salmeron PTA  1/17/2017

## 2017-01-18 NOTE — THERAPY DISCHARGE NOTE
Acute Care - Physical Therapy Treatment Note/Discharge  [facility]     Patient Name: Juliana Gardner  : 1918  MRN: [id number]  Today's Date: 2017             Admit Date: 1/10/2017    Visit Dx:    ICD-10-CM ICD-9-CM   1. Difficulty walking R26.2 719.7     Patient Active Problem List   Diagnosis   • Cellulitis of lower extremity   • Venous stasis dermatitis of both lower extremities   • Open leg wound   • DNR (do not resuscitate)   • Heart murmur   • Cellulitis   • Venous insufficiency of both lower extremities       Physical Therapy Education     Title: PT OT SLP Therapies (Resolved)     Topic: Physical Therapy (Resolved)     Point: Mobility training (Resolved)    Learning Progress Summary    Learner Readiness Method Response Comment Documented by Status   Patient Acceptance E,TB,D VU,NR  RW 17 0907 Done    Acceptance E,TB,D VU,NR  RW 17 0851 Done    Acceptance E,TB DU,VU  CW 01/15/17 1517 Done    Acceptance E DU  MM 17 1152 Done    Acceptance E,D VU  DM 17 0833 Done    Acceptance E VU  JW 17 1651 Done    Acceptance E VU  KH 17 1623 Done               Point: Home exercise program (Resolved)    Learning Progress Summary    Learner Readiness Method Response Comment Documented by Status   Patient Acceptance E,TB DU,VU  CW 01/15/17 1517 Done    Acceptance E,D VU  DM 17 0833 Done    Acceptance E VU  KH 17 1623 Done               Point: Body mechanics (Resolved)    Learning Progress Summary    Learner Readiness Method Response Comment Documented by Status   Patient Acceptance E,TB,D VU,NR  RW 17 0907 Done    Acceptance E,TB,D VU,NR  RW 17 0851 Done    Acceptance E,TB DU,VU  CW 01/15/17 1517 Done    Acceptance E,D VU  DM 17 0833 Done    Acceptance E VU  KH 17 1623 Done               Point: Precautions (Resolved)    Learning Progress Summary    Learner Readiness Method Response Comment Documented by Status   Patient Acceptance E,TB,D VU,NR    01/17/17 0907 Done    Acceptance E,TB,D VU,NR  RW 01/16/17 0851 Done    Acceptance E,TB DU,VU  CW 01/15/17 1517 Done    Acceptance E,D VU  DM 01/13/17 0833 Done                      User Key     Initials Effective Dates Name Provider Type Discipline     05/18/15 -  Nae Bowman, PT Physical Therapist PT    DM 05/18/15 -  Vic Dunn, PTA Physical Therapy Assistant PT    JW 02/18/16 -  Katherin Soni, PTA Physical Therapy Assistant PT    MM 02/18/16 -  Hilaria Guerrero, PTA Physical Therapy Assistant PT    RW 04/06/16 -  Priscilla Salmeron, PTA Physical Therapy Assistant PT    CW 12/13/16 -  Pavel Tellez Physical Therapy Assistant PT                    IP PT Goals       01/18/17 1633 01/11/17 1623       Bed Mobility PT LTG    Bed Mobility PT LTG, Date Established  01/11/17  -     Bed Mobility PT LTG, Time to Achieve  1 wk  -KH     Bed Mobility PT LTG, Activity Type all bed mobility  -RW all bed mobility  -     Bed Mobility PT LTG, [place] Level minimum assist (75% patient effort)  -RW supervision required  -     Bed Mobility PT LTG, Outcome goal not met  -RW      Bed Mobility PT LTG, Reason Goal Not Met discharged from facility  -RW      Transfer Training PT LTG    Transfer Training PT LTG, Date Established  01/11/17  -     Transfer Training PT LTG, Time to Achieve  1 wk  -KH     Transfer Training PT LTG, Activity Type all transfers  -RW all transfers  -     Transfer Training PT LTG, [place] Level minimum assist (75% patient effort)  -RW supervision required  -     Transfer Training PT LTG, Assist Device walker, rolling  -RW walker, rolling  -KH     Transfer Training PT LTG, Outcome goal not met  -RW      Transfer Training PT LTG, Reason Goal Not Met discharged from facility  -RW      Gait Training PT LTG    Gait Training Goal PT LTG, Date Established  01/11/17  -     Gait Training Goal PT LTG, Time to Achieve  1 wk  -KH     Gait Training Goal PT LTG, [place] Level  minimum assist (75% patient effort)  -RW supervision required  -     Gait Training Goal PT LTG, Assist Device walker, rolling  -RW walker, rolling  -     Gait Training Goal PT LTG, Distance to Achieve 75  - ft  -KH     Gait Training Goal PT LTG, Outcome goal not met  -RW      Gait Training Goal PT LTG, Reason Goal Not Met discharged from facility  -RW      Patient Education PT LTG    Patient Education PT LTG, Date Established  01/11/17  -     Patient Education PT LTG, Time to Achieve  1 wk  -     Patient Education PT LTG, Education Type  HEP  -     Patient Education PT LTG, Education Understanding  demonstrate adequately  -     Patient Education PT LTG Outcome goal not met  -RW      Patient Education PT LTG, Reason Goal Not Met discharged from facility  -RW        User Key  (r) = Recorded By, (t) = Taken By, (c) = Cosigned By    Initials Name Provider Type     Nae Bowman, PT Physical Therapist    RW Priscilla Salmeron PTA Physical Therapy Assistant              Adult Rehabilitation Note       01/17/17 0800 01/16/17 0800       Rehab Assessment/Intervention    Discipline physical therapy assistant  -RW physical therapy assistant  -RW     Document Type therapy note (daily note)  -RW therapy note (daily note)  -RW     Subjective Information agree to therapy;no complaints  -RW agree to therapy;complains of;pain  -RW     Patient Effort, Rehab Treatment good  -RW good  -RW     Precautions/Limitations fall precautions  -RW fall precautions  -RW     Recorded by [RW] Priscilla Salmeron PTA [RW] Priscilla Salmeron PTA     Pain Assessment    Pain Assessment 0-10  -RW 0-10  -RW     Pain Score 5  -RW 7  -RW     Pain Type Acute pain  -RW Acute pain  -RW     Pain Location Leg  -RW Leg  -RW     Pain Orientation Right;Left  -RW Right;Left  -RW     Pain Intervention(s) Medication (See MAR);Repositioned;Ambulation/increased activity  -RW Medication (See MAR);Repositioned;Ambulation/increased activity  -RW      Response to Interventions tolerated  -RW tolerated  -RW     Recorded by [RW] Priscilla Salmeron PTA [RW] Priscilla Salmeron PTA     Cognitive Assessment/Intervention    Current Cognitive/Communication Assessment functional  -RW functional  -RW     Orientation Status oriented x 4  -RW oriented x 4  -RW     Follows Commands/Answers Questions 100% of the time  -% of the time  -RW     Personal Safety WNL/WFL  -RW WNL/WFL  -RW     Personal Safety Interventions fall prevention program maintained;gait belt;nonskid shoes/slippers when out of bed  -RW fall prevention program maintained;gait belt;nonskid shoes/slippers when out of bed  -RW     Recorded by [RW] Priscilla Salmeron PTA [RW] Priscilla Salmeron PTA     Bed Mobility, Assessment/Treatment    Bed Mobility, Assistive Device  bed rails;head of bed elevated  -RW     Bed Mob, Supine to Sit, [place] not tested   Pt up in chair  -RW verbal cues required;supervision required  -RW     Bed Mob, Sit to Supine, [place] verbal cues required;minimum assist (75% patient effort)   assist w/ BLE  -RW not tested   Pt up in chair  -RW     Recorded by [RW] Priscilla Salmeron PTA [RW] Priscilla Salmeron PTA     Transfer Assessment/Treatment    Transfers, Sit-Stand [place] verbal cues required;nonverbal cues required (demo/gesture);minimum assist (75% patient effort)  -RW verbal cues required;nonverbal cues required (demo/gesture);minimum assist (75% patient effort)  -RW     Transfers, Stand-Sit [place] verbal cues required;nonverbal cues required (demo/gesture);contact guard assist  -RW verbal cues required;nonverbal cues required (demo/gesture);contact guard assist;minimum assist (75% patient effort)  -RW     Transfers, Sit-Stand-Sit, Assist Device rolling walker  -RW rolling walker  -RW     Toilet Transfer, [place]  verbal cues required;nonverbal cues required (demo/gesture);moderate assist (50% patient effort)  -RW     Toilet Transfer, Assistive Device  rolling  walker   L grab bar  -RW     Transfer, Safety Issues loses balance backward  -RW      Recorded by [RW] Priscilla Salmeron PTA [RW] Priscilla Salmeron PTA     Gait Assessment/Treatment    Gait, [place] Level verbal cues required;nonverbal cues required (demo/gesture);contact guard assist;minimum assist (75% patient effort)  -RW verbal cues required;nonverbal cues required (demo/gesture);contact guard assist;minimum assist (75% patient effort)  -RW     Gait, Assistive Device rolling walker  -RW rolling walker  -RW     Gait, Distance (Feet) 75  -RW 70  -RW     Gait, Gait Deviations forward flexed posture;bilateral:;[place] decreased;step length decreased;stride length decreased  -RW forward flexed posture;bilateral:;[place] decreased;step length decreased;stride length decreased  -RW     Recorded by [RW] Priscilla Salmeron PTA [RW] Priscilla Salmeron PTA     Positioning and Restraints    Pre-Treatment Position sitting in chair/recliner  -RW in bed  -RW     Post Treatment Position bed  -RW chair  -RW     In Bed supine;call light within reach;encouraged to call for assist;exit alarm on  -RW      In Chair  sitting;call light within reach;encouraged to call for assist;exit alarm on  -RW     Recorded by [RW] Priscilla Salmeron PTA [RW] Priscilla Salmeron PTA       User Key  (r) = Recorded By, (t) = Taken By, (c) = Cosigned By    Initials Name Effective Dates     Priscilla Salmeron PTA 04/06/16 -           PT Recommendation and Plan  Anticipated Discharge Disposition: skilled nursing facility  Planned Therapy Interventions: balance training, bed mobility training, gait training, home exercise program, strengthening, transfer training, patient/family education  PT Frequency: daily  Plan of Care Review  Plan Of Care Reviewed With: patient  Progress: improving  Outcome Summary/Follow up Plan: Pt increased ambulation distance slightly. Less pain today.          Outcome Measures       01/17/17 0900 01/16/17 0800       How much help from  another person do you currently need...    Turning from your back to your side while in flat bed without using bedrails? 4  -RW 4  -RW     Moving from lying on back to sitting on the side of a flat bed without bedrails? 3  -RW 4  -RW     Moving to and from a bed to a chair (including a wheelchair)? 3  -RW 3  -RW     Standing up from a chair using your arms (e.g., wheelchair, bedside chair)? 3  -RW 3  -RW     Climbing 3-5 steps with a railing? 2  -RW 2  -RW     To walk in hospital room? 3  -RW 3  -RW     AM-PAC 6 Clicks Score 18  -RW 19  -RW     Functional Assessment    Outcome Measure Options AM-PAC 6 Clicks Basic Mobility (PT)  -RW AM-PAC 6 Clicks Basic Mobility (PT)  -RW       User Key  (r) = Recorded By, (t) = Taken By, (c) = Cosigned By    Initials Name Provider Type    RW Priscilla Salmeron PTA Physical Therapy Assistant           Time Calculation:       Therapy Charges for Today     Code Description Service Date Service Provider Modifiers Qty    75806434816 HC PT THER PROC EA 15 MIN 1/17/2017 Priscilla Salmeron PTA GP 1          PT G-Codes  Outcome Measure Options: AM-PAC 6 Clicks Basic Mobility (PT)    PT Discharge Summary  Anticipated Discharge Disposition: skilled nursing facility  Reason for Discharge: Discharge from facility  Outcomes Achieved: Refer to plan of care for updates on goals achieved  Discharge Destination: SNF    Priscilla Salmeron PTA  1/18/2017

## 2017-01-18 NOTE — PLAN OF CARE
Problem: Inpatient Physical Therapy  Goal: Bed Mobility Goal LTG- PT  Outcome: Unable to achieve outcome(s) by discharge Date Met:  01/18/17 01/18/17 1633   Bed Mobility PT LTG   Bed Mobility PT LTG, Activity Type all bed mobility   Bed Mobility PT LTG, [place] Level minimum assist (75% patient effort)   Bed Mobility PT LTG, Outcome goal not met   Bed Mobility PT LTG, Reason Goal Not Met discharged from facility       Goal: Transfer Training Goal 1 LTG- PT  Outcome: Unable to achieve outcome(s) by discharge Date Met:  01/18/17 01/18/17 1633   Transfer Training PT LTG   Transfer Training PT LTG, Activity Type all transfers   Transfer Training PT LTG, [place] Level minimum assist (75% patient effort)   Transfer Training PT LTG, Assist Device walker, rolling   Transfer Training PT LTG, Outcome goal not met   Transfer Training PT LTG, Reason Goal Not Met discharged from facility       Goal: Gait Training Goal LTG- PT  Outcome: Unable to achieve outcome(s) by discharge Date Met:  01/18/17 01/18/17 1633   Gait Training PT LTG   Gait Training Goal PT LTG, [place] Level minimum assist (75% patient effort)   Gait Training Goal PT LTG, Assist Device walker, rolling   Gait Training Goal PT LTG, Distance to Achieve 75   Gait Training Goal PT LTG, Outcome goal not met   Gait Training Goal PT LTG, Reason Goal Not Met discharged from facility       Goal: Patient Education Goal LTG- PT  Outcome: Unable to achieve outcome(s) by discharge Date Met:  01/18/17 01/18/17 1633   Patient Education PT LTG   Patient Education PT LTG Outcome goal not met   Patient Education PT LTG, Reason Goal Not Met discharged from facility

## 2018-10-04 ENCOUNTER — LAB REQUISITION (OUTPATIENT)
Dept: LAB | Facility: HOSPITAL | Age: 83
End: 2018-10-04

## 2018-10-04 DIAGNOSIS — L97.811 NON-PRESSURE CHRONIC ULCER OF OTHER PART OF RIGHT LOWER LEG LIMITED TO BREAKDOWN OF SKIN (HCC): ICD-10-CM

## 2018-10-04 PROCEDURE — 87070 CULTURE OTHR SPECIMN AEROBIC: CPT | Performed by: SURGERY

## 2018-10-04 PROCEDURE — 87205 SMEAR GRAM STAIN: CPT | Performed by: SURGERY

## 2018-10-07 LAB
BACTERIA SPEC AEROBE CULT: NORMAL
GRAM STN SPEC: NORMAL